# Patient Record
Sex: MALE | Race: WHITE | Employment: FULL TIME | URBAN - METROPOLITAN AREA
[De-identification: names, ages, dates, MRNs, and addresses within clinical notes are randomized per-mention and may not be internally consistent; named-entity substitution may affect disease eponyms.]

---

## 2021-01-29 ENCOUNTER — APPOINTMENT (OUTPATIENT)
Dept: CT IMAGING | Age: 56
DRG: 920 | End: 2021-01-29
Payer: COMMERCIAL

## 2021-01-29 ENCOUNTER — HOSPITAL ENCOUNTER (INPATIENT)
Age: 56
LOS: 4 days | Discharge: HOME | DRG: 920 | End: 2021-02-02
Attending: EMERGENCY MEDICINE | Admitting: INTERNAL MEDICINE
Payer: COMMERCIAL

## 2021-01-29 ENCOUNTER — APPOINTMENT (OUTPATIENT)
Dept: NUCLEAR MEDICINE | Age: 56
DRG: 920 | End: 2021-01-29
Payer: COMMERCIAL

## 2021-01-29 DIAGNOSIS — K92.1 BLOOD IN STOOL: Primary | ICD-10-CM

## 2021-01-29 DIAGNOSIS — Z98.890 STATUS POST COLONOSCOPY WITH POLYPECTOMY: ICD-10-CM

## 2021-01-29 PROBLEM — K92.2 LOWER GI BLEED: Status: ACTIVE | Noted: 2021-01-29

## 2021-01-29 LAB
ALBUMIN SERPL-MCNC: 4.3 GM/DL (ref 3.4–5)
ALP BLD-CCNC: 70 IU/L (ref 40–129)
ALT SERPL-CCNC: 28 U/L (ref 10–40)
ANION GAP SERPL CALCULATED.3IONS-SCNC: 12 MMOL/L (ref 4–16)
APTT: 36.8 SECONDS (ref 25.1–37.1)
AST SERPL-CCNC: 20 IU/L (ref 15–37)
BASOPHILS ABSOLUTE: 0.1 K/CU MM
BASOPHILS RELATIVE PERCENT: 0.6 % (ref 0–1)
BILIRUB SERPL-MCNC: 0.2 MG/DL (ref 0–1)
BUN BLDV-MCNC: 14 MG/DL (ref 6–23)
CALCIUM SERPL-MCNC: 9 MG/DL (ref 8.3–10.6)
CHLORIDE BLD-SCNC: 100 MMOL/L (ref 99–110)
CO2: 22 MMOL/L (ref 21–32)
CREAT SERPL-MCNC: 0.6 MG/DL (ref 0.9–1.3)
DIFFERENTIAL TYPE: ABNORMAL
EOSINOPHILS ABSOLUTE: 0.3 K/CU MM
EOSINOPHILS RELATIVE PERCENT: 3.4 % (ref 0–3)
GFR AFRICAN AMERICAN: >60 ML/MIN/1.73M2
GFR NON-AFRICAN AMERICAN: >60 ML/MIN/1.73M2
GLUCOSE BLD-MCNC: 139 MG/DL (ref 70–99)
HCT VFR BLD CALC: 25.8 % (ref 42–52)
HCT VFR BLD CALC: 27.9 % (ref 42–52)
HCT VFR BLD CALC: 36.4 % (ref 42–52)
HEMOGLOBIN: 12.4 GM/DL (ref 13.5–18)
HEMOGLOBIN: 8.5 GM/DL (ref 13.5–18)
HEMOGLOBIN: 9.1 GM/DL (ref 13.5–18)
IMMATURE NEUTROPHIL %: 0.8 % (ref 0–0.43)
INR BLD: 0.92 INDEX
LACTATE: 2.1 MMOL/L (ref 0.4–2)
LACTATE: 2.4 MMOL/L (ref 0.4–2)
LIPASE: 19 IU/L (ref 13–60)
LYMPHOCYTES ABSOLUTE: 2.1 K/CU MM
LYMPHOCYTES RELATIVE PERCENT: 26.9 % (ref 24–44)
MCH RBC QN AUTO: 31.8 PG (ref 27–31)
MCHC RBC AUTO-ENTMCNC: 34.1 % (ref 32–36)
MCV RBC AUTO: 93.3 FL (ref 78–100)
MONOCYTES ABSOLUTE: 0.7 K/CU MM
MONOCYTES RELATIVE PERCENT: 9.2 % (ref 0–4)
NUCLEATED RBC %: 0 %
PDW BLD-RTO: 12.8 % (ref 11.7–14.9)
PLATELET # BLD: 266 K/CU MM (ref 140–440)
PMV BLD AUTO: 10.7 FL (ref 7.5–11.1)
POTASSIUM SERPL-SCNC: 4.4 MMOL/L (ref 3.5–5.1)
PROTHROMBIN TIME: 11.1 SECONDS (ref 11.7–14.5)
RBC # BLD: 3.9 M/CU MM (ref 4.6–6.2)
SEGMENTED NEUTROPHILS ABSOLUTE COUNT: 4.6 K/CU MM
SEGMENTED NEUTROPHILS RELATIVE PERCENT: 59.1 % (ref 36–66)
SODIUM BLD-SCNC: 134 MMOL/L (ref 135–145)
TOTAL IMMATURE NEUTOROPHIL: 0.06 K/CU MM
TOTAL NUCLEATED RBC: 0 K/CU MM
TOTAL PROTEIN: 6.8 GM/DL (ref 6.4–8.2)
WBC # BLD: 7.7 K/CU MM (ref 4–10.5)

## 2021-01-29 PROCEDURE — 2580000003 HC RX 258: Performed by: SPECIALIST

## 2021-01-29 PROCEDURE — 2580000003 HC RX 258: Performed by: INTERNAL MEDICINE

## 2021-01-29 PROCEDURE — 86850 RBC ANTIBODY SCREEN: CPT

## 2021-01-29 PROCEDURE — P9016 RBC LEUKOCYTES REDUCED: HCPCS

## 2021-01-29 PROCEDURE — 2580000003 HC RX 258: Performed by: EMERGENCY MEDICINE

## 2021-01-29 PROCEDURE — 85018 HEMOGLOBIN: CPT

## 2021-01-29 PROCEDURE — 99285 EMERGENCY DEPT VISIT HI MDM: CPT

## 2021-01-29 PROCEDURE — 6370000000 HC RX 637 (ALT 250 FOR IP): Performed by: INTERNAL MEDICINE

## 2021-01-29 PROCEDURE — 83605 ASSAY OF LACTIC ACID: CPT

## 2021-01-29 PROCEDURE — 86900 BLOOD TYPING SEROLOGIC ABO: CPT

## 2021-01-29 PROCEDURE — 6360000004 HC RX CONTRAST MEDICATION: Performed by: EMERGENCY MEDICINE

## 2021-01-29 PROCEDURE — 85730 THROMBOPLASTIN TIME PARTIAL: CPT

## 2021-01-29 PROCEDURE — A9560 TC99M LABELED RBC: HCPCS | Performed by: SPECIALIST

## 2021-01-29 PROCEDURE — 74177 CT ABD & PELVIS W/CONTRAST: CPT

## 2021-01-29 PROCEDURE — 78278 ACUTE GI BLOOD LOSS IMAGING: CPT

## 2021-01-29 PROCEDURE — 1200000000 HC SEMI PRIVATE

## 2021-01-29 PROCEDURE — 85025 COMPLETE CBC W/AUTO DIFF WBC: CPT

## 2021-01-29 PROCEDURE — C9113 INJ PANTOPRAZOLE SODIUM, VIA: HCPCS | Performed by: EMERGENCY MEDICINE

## 2021-01-29 PROCEDURE — 85610 PROTHROMBIN TIME: CPT

## 2021-01-29 PROCEDURE — 36415 COLL VENOUS BLD VENIPUNCTURE: CPT

## 2021-01-29 PROCEDURE — 85014 HEMATOCRIT: CPT

## 2021-01-29 PROCEDURE — 96374 THER/PROPH/DIAG INJ IV PUSH: CPT

## 2021-01-29 PROCEDURE — 80053 COMPREHEN METABOLIC PANEL: CPT

## 2021-01-29 PROCEDURE — 6360000002 HC RX W HCPCS: Performed by: EMERGENCY MEDICINE

## 2021-01-29 PROCEDURE — 3430000000 HC RX DIAGNOSTIC RADIOPHARMACEUTICAL: Performed by: SPECIALIST

## 2021-01-29 PROCEDURE — 94761 N-INVAS EAR/PLS OXIMETRY MLT: CPT

## 2021-01-29 PROCEDURE — 83690 ASSAY OF LIPASE: CPT

## 2021-01-29 PROCEDURE — 86922 COMPATIBILITY TEST ANTIGLOB: CPT

## 2021-01-29 PROCEDURE — 86901 BLOOD TYPING SEROLOGIC RH(D): CPT

## 2021-01-29 RX ORDER — SODIUM CHLORIDE 9 MG/ML
INJECTION, SOLUTION INTRAVENOUS PRN
Status: DISCONTINUED | OUTPATIENT
Start: 2021-01-29 | End: 2021-02-01 | Stop reason: SDUPTHER

## 2021-01-29 RX ORDER — ALPRAZOLAM 0.5 MG/1
0.5 TABLET ORAL 3 TIMES DAILY PRN
COMMUNITY

## 2021-01-29 RX ORDER — ESCITALOPRAM OXALATE 10 MG/1
20 TABLET ORAL DAILY
Status: DISCONTINUED | OUTPATIENT
Start: 2021-01-29 | End: 2021-02-02 | Stop reason: HOSPADM

## 2021-01-29 RX ORDER — ACETAMINOPHEN 650 MG/1
650 SUPPOSITORY RECTAL EVERY 6 HOURS PRN
Status: DISCONTINUED | OUTPATIENT
Start: 2021-01-29 | End: 2021-02-02 | Stop reason: HOSPADM

## 2021-01-29 RX ORDER — SODIUM CHLORIDE 0.9 % (FLUSH) 0.9 %
10 SYRINGE (ML) INJECTION PRN
Status: DISCONTINUED | OUTPATIENT
Start: 2021-01-29 | End: 2021-02-02 | Stop reason: HOSPADM

## 2021-01-29 RX ORDER — SODIUM CHLORIDE 9 MG/ML
INJECTION, SOLUTION INTRAVENOUS CONTINUOUS
Status: DISCONTINUED | OUTPATIENT
Start: 2021-01-29 | End: 2021-02-01

## 2021-01-29 RX ORDER — GABAPENTIN 300 MG/1
300 CAPSULE ORAL 2 TIMES DAILY
COMMUNITY
Start: 2021-01-18

## 2021-01-29 RX ORDER — 0.9 % SODIUM CHLORIDE 0.9 %
1000 INTRAVENOUS SOLUTION INTRAVENOUS ONCE
Status: COMPLETED | OUTPATIENT
Start: 2021-01-29 | End: 2021-01-29

## 2021-01-29 RX ORDER — ESCITALOPRAM OXALATE 20 MG/1
20 TABLET ORAL DAILY
COMMUNITY

## 2021-01-29 RX ORDER — M-VIT,TX,IRON,MINS/CALC/FOLIC 27MG-0.4MG
TABLET ORAL DAILY
Status: DISCONTINUED | OUTPATIENT
Start: 2021-01-29 | End: 2021-02-02 | Stop reason: HOSPADM

## 2021-01-29 RX ORDER — GABAPENTIN 300 MG/1
300 CAPSULE ORAL 2 TIMES DAILY
Status: DISCONTINUED | OUTPATIENT
Start: 2021-01-29 | End: 2021-02-02 | Stop reason: HOSPADM

## 2021-01-29 RX ORDER — ONDANSETRON 2 MG/ML
4 INJECTION INTRAMUSCULAR; INTRAVENOUS EVERY 6 HOURS PRN
Status: DISCONTINUED | OUTPATIENT
Start: 2021-01-29 | End: 2021-02-02 | Stop reason: HOSPADM

## 2021-01-29 RX ORDER — SODIUM CHLORIDE 0.9 % (FLUSH) 0.9 %
10 SYRINGE (ML) INJECTION EVERY 12 HOURS SCHEDULED
Status: DISCONTINUED | OUTPATIENT
Start: 2021-01-29 | End: 2021-02-02 | Stop reason: HOSPADM

## 2021-01-29 RX ORDER — ALBUTEROL SULFATE 90 UG/1
2 AEROSOL, METERED RESPIRATORY (INHALATION) EVERY 6 HOURS PRN
Status: DISCONTINUED | OUTPATIENT
Start: 2021-01-29 | End: 2021-02-02 | Stop reason: HOSPADM

## 2021-01-29 RX ORDER — ACETAMINOPHEN 325 MG/1
650 TABLET ORAL EVERY 6 HOURS PRN
Status: DISCONTINUED | OUTPATIENT
Start: 2021-01-29 | End: 2021-02-02 | Stop reason: HOSPADM

## 2021-01-29 RX ORDER — PROMETHAZINE HYDROCHLORIDE 12.5 MG/1
12.5 TABLET ORAL EVERY 6 HOURS PRN
Status: DISCONTINUED | OUTPATIENT
Start: 2021-01-29 | End: 2021-02-02 | Stop reason: HOSPADM

## 2021-01-29 RX ORDER — ALPRAZOLAM 0.5 MG/1
0.5 TABLET ORAL 3 TIMES DAILY PRN
Status: DISCONTINUED | OUTPATIENT
Start: 2021-01-29 | End: 2021-01-30

## 2021-01-29 RX ORDER — PANTOPRAZOLE SODIUM 40 MG/10ML
40 INJECTION, POWDER, LYOPHILIZED, FOR SOLUTION INTRAVENOUS ONCE
Status: COMPLETED | OUTPATIENT
Start: 2021-01-29 | End: 2021-01-29

## 2021-01-29 RX ORDER — FLUTICASONE FUROATE AND VILANTEROL TRIFENATATE 100; 25 UG/1; UG/1
POWDER RESPIRATORY (INHALATION) DAILY
COMMUNITY
Start: 2021-01-25

## 2021-01-29 RX ORDER — BUDESONIDE AND FORMOTEROL FUMARATE DIHYDRATE 80; 4.5 UG/1; UG/1
2 AEROSOL RESPIRATORY (INHALATION) 2 TIMES DAILY
Status: DISCONTINUED | OUTPATIENT
Start: 2021-01-29 | End: 2021-02-02 | Stop reason: HOSPADM

## 2021-01-29 RX ADMIN — SODIUM CHLORIDE 1000 ML: 9 INJECTION, SOLUTION INTRAVENOUS at 08:18

## 2021-01-29 RX ADMIN — GABAPENTIN 300 MG: 300 CAPSULE ORAL at 11:36

## 2021-01-29 RX ADMIN — ESCITALOPRAM OXALATE 20 MG: 10 TABLET ORAL at 11:36

## 2021-01-29 RX ADMIN — MULTIPLE VITAMINS W/ MINERALS TAB 1 TABLET: TAB at 11:36

## 2021-01-29 RX ADMIN — SODIUM CHLORIDE, PRESERVATIVE FREE 10 ML: 5 INJECTION INTRAVENOUS at 11:39

## 2021-01-29 RX ADMIN — IOPAMIDOL 80 ML: 755 INJECTION, SOLUTION INTRAVENOUS at 08:03

## 2021-01-29 RX ADMIN — ALPRAZOLAM 0.5 MG: 0.5 TABLET ORAL at 22:04

## 2021-01-29 RX ADMIN — PANTOPRAZOLE SODIUM 40 MG: 40 INJECTION, POWDER, FOR SOLUTION INTRAVENOUS at 08:19

## 2021-01-29 RX ADMIN — GABAPENTIN 300 MG: 300 CAPSULE ORAL at 17:43

## 2021-01-29 RX ADMIN — Medication 28.6 MILLICURIE: at 20:23

## 2021-01-29 RX ADMIN — ALPRAZOLAM 0.5 MG: 0.5 TABLET ORAL at 11:36

## 2021-01-29 RX ADMIN — SODIUM CHLORIDE: 9 INJECTION, SOLUTION INTRAVENOUS at 19:52

## 2021-01-29 RX ADMIN — ALPRAZOLAM 0.5 MG: 0.5 TABLET ORAL at 17:43

## 2021-01-29 RX ADMIN — SODIUM CHLORIDE: 9 INJECTION, SOLUTION INTRAVENOUS at 13:25

## 2021-01-29 ASSESSMENT — ENCOUNTER SYMPTOMS
EYES NEGATIVE: 1
BLOOD IN STOOL: 1
DIARRHEA: 1
RESPIRATORY NEGATIVE: 1
ABDOMINAL PAIN: 1
ALLERGIC/IMMUNOLOGIC NEGATIVE: 1

## 2021-01-29 NOTE — ED NOTES
[id number] paged Dr Patricia Murray  01/29/21 [phone] Dr Stef Ahmadi returned call     Thelbert Malia  01/29/21 [phone]

## 2021-01-29 NOTE — ED NOTES
[id number] paged hospitalist     Cally Cain  01/29/21 [phone] hospitalist returned call      Cally Cain  01/29/21 [phone]

## 2021-01-29 NOTE — ED NOTES
Report given to [facility] and care transferred at this time     Jeffrey Espinoza RN  01/29/21 [phone]

## 2021-01-29 NOTE — PROGRESS NOTES
Medication History  [facility]    Patient Name: Vito Adhikari 1965     Medication history has been completed by: Aidee Alexander CPhT    Source(s) of information: patient and insurance claims     Primary Care Physician: No primary care provider on file. Pharmacy: [place], [place]    Allergies as of 01/29/2021    (No Known Allergies)        Prior to Admission medications    Medication Sig Start Date End Date Taking? Authorizing Provider   ALPRAZolam Annalise Bliss) 0.5 MG tablet Take 0.5 mg by mouth 3 times daily as needed for Sleep. Yes Historical Provider, MD   escitalopram (LEXAPRO) 20 MG tablet Take 20 mg by mouth daily   Yes Historical Provider, MD   Cyanocobalamin (VITAMIN B 12 PO) Take 1 tablet by mouth daily   Yes Historical Provider, MD   VITAMIN D PO Take 1 capsule by mouth daily   Yes Historical Provider, MD   Multiple Vitamins-Minerals (MULTIVITAMIN ADULT, MINERALS,) TABS Take 1 tablet by mouth daily   Yes Historical Provider, MD   Omega-3 Fatty Acids (FISH OIL PO) Take 2 capsules by mouth daily   Yes Historical Provider, MD   MAGNESIUM OXIDE PO Take 1 tablet by mouth daily   Yes Historical Provider, MD ROQUE ELLIPTA 100-25 MCG/INH AEPB inhaler Inhale into the lungs daily 1/25/21  Yes Historical Provider, MD   gabapentin (NEURONTIN) 300 MG capsule Take 300 mg by mouth 2 times daily. 1/18/21  Yes Historical Provider, MD   VENTOLIN  (90 Base) MCG/ACT inhaler Inhale 2 puffs into the lungs every 6 hours as needed 1/18/21  Yes Historical Provider, MD     Medications added or changed (ex. new medication, dosage change, interval change, formulation change):  See medication list as stated above    Comments:  Medication list reviewed with patient and insurance claims verified.   Patient reports he has taken no meds piror to ER visit    To my knowledge the above medication history is accurate as of 1/29/2021 9:04 AM.   Aidee Alexander CPhT   1/29/2021 9:04 AM

## 2021-01-29 NOTE — ED PROVIDER NOTES
[facility]      TRIAGE CHIEF COMPLAINT:   Rectal Bleeding and Dizziness      Ewiiaapaayp:  Oscar Dougherty is a 54 y.o. male that presents complaint of rectal bleeding. Patient streptococcal he is here for work, states he had a colonoscopy and upper endoscopy about a week ago had some polyps removed and and last night and today he has been having it. Rectal bleeding several times. Feels lightheaded and dizzy. Has some mild generalized abdominal pain. Denies any blood thinners loss of consciousness no fevers no nausea vomiting no chest pain shortness of breath denies vomiting blood denies any other trauma. No other questions or concerns. No abdominal surgeries before    REVIEW OF SYSTEMS:  At least 10 systems reviewed and otherwise acutely negative except as in the [address] Review of Systems   Constitutional: Negative. HENT: Negative. Eyes: Negative. Respiratory: Negative. Cardiovascular: Negative. Gastrointestinal: Positive for abdominal pain, blood in stool and diarrhea. Endocrine: Negative. Genitourinary: Negative. Musculoskeletal: Negative. Skin: Negative. Allergic/Immunologic: Negative. Neurological: Positive for dizziness and light-headedness. Hematological: Negative. Psychiatric/Behavioral: Negative. All other systems reviewed and are negative. Past Medical History:   Diagnosis Date    Neuropathy      History reviewed. No pertinent surgical history. History reviewed. No pertinent family history.   Social History     Socioeconomic History    Marital status:      Spouse name: Not on file    Number of children: Not on file    Years of education: Not on file    Highest education level: Not on file   Occupational History    Not on file   Social Needs    Financial resource strain: Not on file    Food insecurity     Worry: Not on file     Inability: Not on file    Transportation needs     Medical: Not on file     Non-medical: Not on file Medications   Medication Sig Dispense Refill    ALPRAZolam (XANAX) 0.5 MG tablet Take 0.5 mg by mouth 3 times daily as needed for Sleep.  escitalopram (LEXAPRO) 20 MG tablet Take 20 mg by mouth daily      Cyanocobalamin (VITAMIN B 12 PO) Take 1 tablet by mouth daily      VITAMIN D PO Take 1 capsule by mouth daily      Multiple Vitamins-Minerals (MULTIVITAMIN ADULT, MINERALS,) TABS Take 1 tablet by mouth daily      Omega-3 Fatty Acids (FISH OIL PO) Take 2 capsules by mouth daily      MAGNESIUM OXIDE PO Take 1 tablet by mouth daily      BREO ELLIPTA 100-25 MCG/INH AEPB inhaler Inhale into the lungs daily      gabapentin (NEURONTIN) 300 MG capsule Take 300 mg by mouth 2 times daily.  VENTOLIN  (90 Base) MCG/ACT inhaler Inhale 2 puffs into the lungs every 6 hours as needed         Nursing Notes Reviewed    VITAL SIGNS:  ED Triage Vitals   Enc Vitals Group      BP       Pulse       Resp       Temp       Temp src       SpO2       Weight       Height       Head Circumference       Peak Flow       Pain Score       Pain Loc       Pain Edu? Excl. in [address]? PHYSICAL EXAM:  Physical Exam  Vitals signs and nursing note reviewed. Constitutional:       General: He is not in acute distress. Appearance: Normal appearance. He is well-developed and well-groomed. He is obese. He is not ill-appearing, toxic-appearing or diaphoretic. HENT:      Head: Normocephalic and atraumatic. Right Ear: External ear normal.      Left Ear: External ear normal.      Nose: No congestion or rhinorrhea. Eyes:      General: No scleral icterus. Right eye: No discharge. Left eye: No discharge. Extraocular Movements: Extraocular movements intact. Conjunctiva/sclera: Conjunctivae normal.   Neck:      Musculoskeletal: Full passive range of motion without pain and normal range of motion. Normal range of motion.  No edema, erythema, neck rigidity, crepitus, injury, pain with movement or torticollis. Vascular: No JVD. Trachea: Phonation normal.   Cardiovascular:      Rate and Rhythm: Normal rate and regular rhythm. Pulses: Normal pulses. Heart sounds: Normal heart sounds. No murmur. No friction rub. No gallop. Pulmonary:      Effort: Pulmonary effort is normal. No respiratory distress. Breath sounds: Normal breath sounds. No stridor. No wheezing, rhonchi or rales. Abdominal:      General: Bowel sounds are normal. There is no distension. There are no signs of injury. Palpations: Abdomen is soft. There is no mass or pulsatile mass. Tenderness: There is generalized abdominal tenderness. There is no guarding or rebound. Negative signs include Gonzales's sign, Rovsing's sign and McBurney's sign. Hernia: No hernia is present. Musculoskeletal: Normal range of motion. General: No swelling, tenderness, deformity or signs of injury. Right lower leg: No edema. Left lower leg: No edema. Skin:     General: Skin is warm. Coloration: Skin is not jaundiced or pale. Findings: No bruising, erythema, lesion or rash. Neurological:      General: No focal deficit present. Mental Status: He is alert and oriented to person, place, and time. GCS: GCS eye subscore is 4. GCS verbal subscore is 5. GCS motor subscore is 6. Cranial Nerves: Cranial nerves are intact. No cranial nerve deficit, dysarthria or facial asymmetry. Sensory: No sensory deficit. Motor: Motor function is intact. No weakness, tremor, atrophy, abnormal muscle tone or seizure activity. Coordination: Coordination is intact. Coordination normal.      Gait: Gait is intact. Gait normal.   Psychiatric:         Mood and Affect: Mood normal.         Behavior: Behavior normal. Behavior is cooperative. Thought Content:  Thought content normal.           I have reviewed andinterpreted all of the currently available lab results from this visit (if applicable):    Results for orders placed or performed during the hospital encounter of 01/29/21   CBC Auto Differential   Result Value Ref Range    WBC 7.7 4.0 - 10.5 K/CU MM    RBC 3.90 (L) 4.6 - 6.2 M/CU MM    Hemoglobin 12.4 (L) 13.5 - 18.0 GM/DL    Hematocrit 36.4 (L) 42 - 52 %    MCV 93.3 78 - 100 FL    MCH 31.8 (H) 27 - 31 PG    MCHC 34.1 32.0 - 36.0 %    RDW 12.8 11.7 - 14.9 %    Platelets 220 651 - 724 K/CU MM    MPV 10.7 7.5 - 11.1 FL    Differential Type AUTOMATED DIFFERENTIAL     Segs Relative 59.1 36 - 66 %    Lymphocytes % 26.9 24 - 44 %    Monocytes % 9.2 (H) 0 - 4 %    Eosinophils % 3.4 (H) 0 - 3 %    Basophils % 0.6 0 - 1 %    Segs Absolute 4.6 K/CU MM    Lymphocytes Absolute 2.1 K/CU MM    Monocytes Absolute 0.7 K/CU MM    Eosinophils Absolute 0.3 K/CU MM    Basophils Absolute 0.1 K/CU MM    Nucleated RBC % 0.0 %    Total Nucleated RBC 0.0 K/CU MM    Total Immature Neutrophil 0.06 K/CU MM    Immature Neutrophil % 0.8 (H) 0 - 0.43 %   CMP   Result Value Ref Range    Sodium 134 (L) 135 - 145 MMOL/L    Potassium 4.4 3.5 - 5.1 MMOL/L    Chloride 100 99 - 110 mMol/L    CO2 22 21 - 32 MMOL/L    BUN 14 6 - 23 MG/DL    CREATININE 0.6 (L) 0.9 - 1.3 MG/DL    Glucose 139 (H) 70 - 99 MG/DL    Calcium 9.0 8.3 - 10.6 MG/DL    Albumin 4.3 3.4 - 5.0 GM/DL    Total Protein 6.8 6.4 - 8.2 GM/DL    Total Bilirubin 0.2 0.0 - 1.0 MG/DL    ALT 28 10 - 40 U/L    AST 20 15 - 37 IU/L    Alkaline Phosphatase 70 40 - 129 IU/L    GFR Non-African American >60 >60 mL/min/1.73m2    GFR African American >60 >60 mL/min/1.73m2    Anion Gap 12 4 - 16   Lipase   Result Value Ref Range    Lipase 19 13 - 60 IU/L   Lactic Acid, Plasma   Result Value Ref Range    Lactate 2.1 (HH) 0.4 - 2.0 mMOL/L   Protime/INR & PTT   Result Value Ref Range    Protime 11.1 (L) 11.7 - 14.5 SECONDS    INR 0.92 INDEX    aPTT 36.8 25.1 - 37.1 SECONDS   Lactic Acid, Plasma   Result Value Ref Range    Lactate 2.4 (HH) 0.4 - 2.0 mMOL/L   TYPE AND SCREEN   Result Value Ref Range    ABO/Rh O POSITIVE     Antibody Screen NEGATIVE         Radiographs (if obtained):  [] The following radiograph was interpreted by myself in the absence of a radiologist:  [x] Radiologist's Report Reviewed:    CT Abd/Pelv    EKG (if obtained): (All EKG's are interpreted by myself in the absence of a cardiologist)      Total critical care time today provided was at least 20 minutes. This excludes seperately billable procedure. Critical care time provided for reviewing labs, images, giving fluids, Protonix consulting GI consulting medicine that required close evaluation and/or intervention with concern for patient decompensation. MDM:    Patient here with rectal bleeding. Again he is from [facility] he is visiting he states he had a colonoscopy and upper endoscopy about a week ago in [facility] he states last night today he has had had some rectal bleeding. Several episodes. Blood has felt lightheaded has some mild generalized abdominal pain. Denies any nausea vomiting fevers chest pain shortness of breath denies any vomiting blood. Patient otherwise well appearing. Patient be given IV fluids Protonix will check labs imaging. Patient rechecked doing well he has had some more bloody bowel movements here but hemoglobin stable vital signs are stable his lactic acid did mildly increased from 2.1-2.4. CT scan is negative I did talk to GI recommend giving him Protonix admitted him to the hospital getting records from outside hospital as he likely has post polypectomy bleed and needs to be watched overnight patient agreeable to be observed overnight with GI consultation serial hemoglobin hematocrits. I will consult hospital medicine admit patient otherwise stable.     CLINICAL IMPRESSION:  Final diagnoses:   Blood in stool   Status post colonoscopy with polypectomy       (Please note that portions of this note may have been completed with a voice recognition program. Efforts were made to edit the dictations but occasionally words aremis-transcribed.)    DISPOSITION REFERRAL (if applicable):  No follow-up provider specified.     DISPOSITION MEDICATIONS (if applicable):  New Prescriptions    No medications on file          Carlo Holstein, [address] [facility],   01/29/21 [id number]

## 2021-01-29 NOTE — CONSULTS
[address], [address] [facility]                                  CONSULTATION    PATIENT NAME: Jimmy Montelongo                          :        1965  MED REC NO:   [id number]                          ROOM:       [id number]  ACCOUNT NO:   [de-identified]                           ADMIT DATE: 2021  PROVIDER:     Zoila Alvarez MD    CONSULT DATE:  2021    CHIEF COMPLAINT:  History of painless hematochezia. The patient is  status post colonoscopy with polypectomy on the 2021, rule out  post polypectomy bleeding. 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 The patient  denies abdominal pain, nausea, vomiting, hematemesis, anorexia or weight  loss. There is no history of fever or chills as well. The patient has  had about 4 or 5 bloody BMs prior to coming to the emergency room. In  the ER, the patient was hemodynamically stable. CBC showed a WBC count  of 7.7, hemoglobin 12.4, platelet count of 234,594. INR is 0.92. CAT  scan of the abdomen and pelvis was done and there was no evidence of  active bleeding and hepatomegaly with steatosis however was noted along  with scattered diverticulosis without obvious inflammation. A small fat  containing ventral hernia was noted as well. The patient was admitted  for further workup and management. The patient had an EGD and  colonoscopy both on the 2021 by Dr. Herrera Turcios in [facility] for  gastroesophageal reflux disease and history of polyp. According to the  patient, he had a \"fairly large 2 cm\" colon polyp removed and was  requested to have a followup colonoscopy in 1-year time. The patient  did well post procedure until early this morning when he woke up with  profuse hematochezia. The patient has had prior EGD done approximately 10 years ago also and a  colonoscopy at age 39 and had a polyp removed. The patient was told to  have a followup colonoscopy in 3 years' time, but the patient did not  have the colonoscopy until 01/25/2021. The patient is hemodynamically  stable at present. REVIEW OF SYSTEMS:  CENTRAL NERVOUS SYSTEM:  The patient denies headache or focal  sensorimotor symptoms. CARDIOVASCULAR SYSTEM:  No history of chest pain, shortness of breath or  leg swelling. GENITOURINARY SYSTEM:  No history of dysuria, pyuria, or hematuria. MUSCULOSKELETAL SYSTEM:  No history of aches and pains in muscles or  joints. RESPIRATORY SYSTEM:  No history of cough, hemoptysis, fever, or chills. PAST MEDICAL HISTORY:  Significant for history of neuropathy and  gastroesophageal reflux disease. FAMILY HISTORY:  The patient's maternal grandmother and grandfather both  were diagnosed with carcinoma of the lung and paternal grandfather also  with carcinoma of the lung. MEDICATIONS:  The patient denies taking NSAIDs or anticoagulants. SOCIOECONOMIC HISTORY:  No history of EtOH abuse, but the patient is a  current every day smoker. PAST SURGICAL HISTORY:  None. ALLERGIES:  No known drug allergies. PHYSICAL EXAMINATION:  GENERAL:  Shows a 42-year-old white gentleman of average build and  nutritional status, who is lying comfortably flat in bed, in no acute  distress. He is awake, alert, and oriented and pleasant to talk with. VITAL SIGNS:  Stable. HEENT:  Shows skull to be atraumatic. NECK:  Supple. CHEST:  Clear. HEART:  S1 and S2 are normal.  ABDOMEN:  Soft, nontender and nondistended. Liver and spleen are not  palpable. Bowel sounds are present. RECTAL:  Deferred. CNS:  Shows the patient to be awake, alert, and oriented. There are no  focal sensorimotor signs. MUSCULOSKELETAL:  Unremarkable.     LABORATORY DATA:  The labs drawn during the present hospitalization comprised of chem profile today, which is unremarkable. LFTs are within  normal limits. CBC shows a WBC count of 7.7, hemoglobin 12.4, platelet  count of 297,771 and INR is 0.92. CAT scan of the abdomen and pelvis is  negative for acute finding. IMPRESSION:  A 79-year-old white gentleman in apparently good health,  presents with acute onset of painless hematochezia, status post  colonoscopy with polypectomy on the 01/25/2021, rule out post  polypectomy bleeding. RECOMMENDATIONS:  1. Agree with present management with IV fluids. 2.  Parenteral analgesics and antiemetics as needed. 3.  Monitor the patient's serial H and H and transfuse on a p.r.n. basis  to keep hemoglobin above 7 gm percent. 4.  We will check EGD, colonoscopy, and path reports from the patient's  primary gastroenterologist in [facility] and to find out the site of  polypectomy. 5.  We will observe the patient for any signs of recurrent bleeding. 6.  If the patient has recurrent bleeding or continues to drop his  hemoglobin, will need a repeat colonoscopy as well. 7.  Bleeding scan and surgical consult later if needed. 8.  The case and plan have been discussed in detail with the patient.         Erinn Henriquez MD    D: 01/29/2021 12:26:14       T: 01/29/2021 14:09:31     [email or web address]  Job#: [id number]     Doc#: [id number]    CC:

## 2021-01-29 NOTE — H&P
History and Physical      Name:  Chris Howard /Age/Sex: 1965  (54 y.o. male)   MRN & CSN:  [id number] & [id number] Admission Date/Time: 2021  6:10 AM   Location:  [place] [address] PCP: No primary care provider on file. Hospital Day: 1    Assessment and Plan:   Chris Howard is a 54 y.o.  male  who presents with GI bleed    1) Acute blood loss anemia due to LGIB  -Likely post polypectomy bleed  -Vitally stable  -CT abdomen pelvis: Nonacute, also showed scattered diverticulosis without obvious inflammation. Hepatomegaly with steatosis. -Monitor hemoglobin every 6 hours  -Transfuse with packed red blood cell if Hb is less than 7  -GI consult placed    2) Obesity  -Diet counseling    3) Anxiety disorder  -On Xanax as needed        Diet No diet orders on file   DVT Prophylaxis [] Lovenox, []  Heparin, [] SCDs, [] Ambulation   GI Prophylaxis [] PPI,  [] H2 Blocker,  [] Carafate,  [] Diet/Tube Feeds   Code Status No Order   Disposition Patient requires continued admission due to GI bleed   MDM [] Low, [x] Moderate,[]  High  Patient's risk as above due to GI bleed     History of Present Illness:     Chief Complaint: <principal problem not specified>  Chris Howard is a 54 y.o.  male  who presents with GI bleed. Patient reported his symptoms started yesterday night, with dark blood. Has had several episodes more than 5 times. Reported associated lightheadedness. He denied any abdominal pain, chest pain or shortness of breath. Patient is not on any blood thinners and denied any history of bleeding disorders. Of note, patient recently had EGD and colonoscopy on 2021 in [facility]. Per patient, he had a polypectomy done.   Ten point ROS reviewed negative, unless as noted above    Objective:   No intake or output data in the 24 hours ending 21 1006   Vitals:   Vitals:    21 0900   BP: 114/73   Pulse: 86   Resp:    Temp:    SpO2: 94%     Physical Exam:   GEN Awake male, sitting upright in bed in no Physical activity     Days per week: None     Minutes per session: None    Stress: None   Relationships    Social connections     Talks on phone: None     Gets together: None     Attends Faith service: None     Active member of club or organization: None     Attends meetings of clubs or organizations: None     Relationship status: None    Intimate partner violence     Fear of current or ex partner: None     Emotionally abused: None     Physically abused: None     Forced sexual activity: None   Other Topics Concern    None   Social History Narrative    None       Medications:   Medications:    Infusions:   PRN Meds:       Electronically signed by Mary Lou Donahue MD on 1/29/2021 at 10:06 AM

## 2021-01-29 NOTE — PROGRESS NOTES
PT HAD 2 BMS WITH DARK COLORED BLOOD  VITALS STABLE   LABS NOTED HB 9.1 AFTER HYDRATION  WILL GET STAT BLEEDING AND SURGICAL CONSULT ALSO  IF BLEEDING SCAN POSITIVE WILL DO COLONOSCOPY  COLONOSCOPY REPORT FROM 01/25/20 REVIEWED PT HAD 5 POLYPS REMOVED

## 2021-01-29 NOTE — ED NOTES
Hand off report received from RONALD BLUM [facility], [facility]  01/29/21 9418

## 2021-01-30 ENCOUNTER — ANESTHESIA EVENT (OUTPATIENT)
Dept: INTERVENTIONAL RADIOLOGY/VASCULAR | Age: 56
DRG: 920 | End: 2021-01-30
Payer: COMMERCIAL

## 2021-01-30 ENCOUNTER — APPOINTMENT (OUTPATIENT)
Dept: INTERVENTIONAL RADIOLOGY/VASCULAR | Age: 56
DRG: 920 | End: 2021-01-30
Payer: COMMERCIAL

## 2021-01-30 ENCOUNTER — ANESTHESIA (OUTPATIENT)
Dept: INTERVENTIONAL RADIOLOGY/VASCULAR | Age: 56
DRG: 920 | End: 2021-01-30
Payer: COMMERCIAL

## 2021-01-30 VITALS
DIASTOLIC BLOOD PRESSURE: 65 MMHG | RESPIRATION RATE: 1 BRPM | SYSTOLIC BLOOD PRESSURE: 92 MMHG | TEMPERATURE: 98.6 F | OXYGEN SATURATION: 100 %

## 2021-01-30 LAB
ALBUMIN SERPL-MCNC: 3.3 GM/DL (ref 3.4–5)
ALP BLD-CCNC: 51 IU/L (ref 40–128)
ALT SERPL-CCNC: 16 U/L (ref 10–40)
AMYLASE: 29 U/L (ref 25–115)
ANION GAP SERPL CALCULATED.3IONS-SCNC: 8 MMOL/L (ref 4–16)
AST SERPL-CCNC: 11 IU/L (ref 15–37)
BACTERIA: NEGATIVE /HPF
BASOPHILS ABSOLUTE: 0 K/CU MM
BASOPHILS RELATIVE PERCENT: 0.3 % (ref 0–1)
BILIRUB SERPL-MCNC: 0.2 MG/DL (ref 0–1)
BILIRUBIN URINE: NEGATIVE MG/DL
BLOOD, URINE: NEGATIVE
BUN BLDV-MCNC: 13 MG/DL (ref 6–23)
CALCIUM SERPL-MCNC: 7.4 MG/DL (ref 8.3–10.6)
CHLORIDE BLD-SCNC: 105 MMOL/L (ref 99–110)
CLARITY: CLEAR
CO2: 23 MMOL/L (ref 21–32)
COLOR: YELLOW
CREAT SERPL-MCNC: 0.7 MG/DL (ref 0.9–1.3)
DIFFERENTIAL TYPE: ABNORMAL
EOSINOPHILS ABSOLUTE: 0.1 K/CU MM
EOSINOPHILS RELATIVE PERCENT: 0.8 % (ref 0–3)
GFR AFRICAN AMERICAN: >60 ML/MIN/1.73M2
GFR NON-AFRICAN AMERICAN: >60 ML/MIN/1.73M2
GLUCOSE BLD-MCNC: 155 MG/DL (ref 70–99)
GLUCOSE, URINE: 50 MG/DL
HCT VFR BLD CALC: 22.6 % (ref 42–52)
HCT VFR BLD CALC: 24.3 % (ref 42–52)
HCT VFR BLD CALC: 25 % (ref 42–52)
HCT VFR BLD CALC: 25.1 % (ref 42–52)
HCT VFR BLD CALC: 25.2 % (ref 42–52)
HEMOGLOBIN: 7.1 GM/DL (ref 13.5–18)
HEMOGLOBIN: 7.9 GM/DL (ref 13.5–18)
HEMOGLOBIN: 8.1 GM/DL (ref 13.5–18)
IMMATURE NEUTROPHIL %: 0.8 % (ref 0–0.43)
IRON: 76 UG/DL (ref 59–158)
KETONES, URINE: NEGATIVE MG/DL
LEUKOCYTE ESTERASE, URINE: NEGATIVE
LIPASE: 13 IU/L (ref 13–60)
LYMPHOCYTES ABSOLUTE: 1 K/CU MM
LYMPHOCYTES RELATIVE PERCENT: 11.5 % (ref 24–44)
MCH RBC QN AUTO: 31.3 PG (ref 27–31)
MCHC RBC AUTO-ENTMCNC: 32.4 % (ref 32–36)
MCV RBC AUTO: 96.5 FL (ref 78–100)
MONOCYTES ABSOLUTE: 0.3 K/CU MM
MONOCYTES RELATIVE PERCENT: 3.7 % (ref 0–4)
MUCUS: ABNORMAL HPF
NITRITE URINE, QUANTITATIVE: NEGATIVE
NUCLEATED RBC %: 0 %
PCT TRANSFERRIN: 31 % (ref 10–44)
PDW BLD-RTO: 13.6 % (ref 11.7–14.9)
PH, URINE: 5 (ref 5–8)
PLATELET # BLD: 184 K/CU MM (ref 140–440)
PMV BLD AUTO: 11.2 FL (ref 7.5–11.1)
POTASSIUM SERPL-SCNC: 5.1 MMOL/L (ref 3.5–5.1)
PROTEIN UA: NEGATIVE MG/DL
RBC # BLD: 2.59 M/CU MM (ref 4.6–6.2)
RBC URINE: ABNORMAL /HPF (ref 0–3)
SARS-COV-2, NAAT: NOT DETECTED
SEGMENTED NEUTROPHILS ABSOLUTE COUNT: 7.4 K/CU MM
SEGMENTED NEUTROPHILS RELATIVE PERCENT: 82.9 % (ref 36–66)
SODIUM BLD-SCNC: 136 MMOL/L (ref 135–145)
SOURCE: NORMAL
SPECIFIC GRAVITY UA: 1.02 (ref 1–1.03)
SQUAMOUS EPITHELIAL: 1 /HPF
TOTAL IMMATURE NEUTOROPHIL: 0.07 K/CU MM
TOTAL IRON BINDING CAPACITY: 244 UG/DL (ref 250–450)
TOTAL NUCLEATED RBC: 0 K/CU MM
TOTAL PROTEIN: 5.1 GM/DL (ref 6.4–8.2)
TRANSITIONAL EPITHELIAL: <1 /HPF
TRICHOMONAS: ABNORMAL /HPF
UNSATURATED IRON BINDING CAPACITY: 168 UG/DL (ref 110–370)
UROBILINOGEN, URINE: NORMAL MG/DL (ref 0.2–1)
WBC # BLD: 8.9 K/CU MM (ref 4–10.5)
WBC UA: ABNORMAL /HPF (ref 0–2)

## 2021-01-30 PROCEDURE — 85018 HEMOGLOBIN: CPT

## 2021-01-30 PROCEDURE — 3700000000 HC ANESTHESIA ATTENDED CARE

## 2021-01-30 PROCEDURE — 36430 TRANSFUSION BLD/BLD COMPNT: CPT

## 2021-01-30 PROCEDURE — B41F1ZZ FLUOROSCOPY OF RIGHT LOWER EXTREMITY ARTERIES USING LOW OSMOLAR CONTRAST: ICD-10-PCS | Performed by: RADIOLOGY

## 2021-01-30 PROCEDURE — 76937 US GUIDE VASCULAR ACCESS: CPT

## 2021-01-30 PROCEDURE — 94640 AIRWAY INHALATION TREATMENT: CPT

## 2021-01-30 PROCEDURE — 77001 FLUOROGUIDE FOR VEIN DEVICE: CPT

## 2021-01-30 PROCEDURE — P9016 RBC LEUKOCYTES REDUCED: HCPCS

## 2021-01-30 PROCEDURE — C1887 CATHETER, GUIDING: HCPCS

## 2021-01-30 PROCEDURE — 2709999900 HC NON-CHARGEABLE SUPPLY

## 2021-01-30 PROCEDURE — 6360000004 HC RX CONTRAST MEDICATION: Performed by: INTERNAL MEDICINE

## 2021-01-30 PROCEDURE — C1769 GUIDE WIRE: HCPCS

## 2021-01-30 PROCEDURE — C1751 CATH, INF, PER/CENT/MIDLINE: HCPCS

## 2021-01-30 PROCEDURE — 6370000000 HC RX 637 (ALT 250 FOR IP): Performed by: INTERNAL MEDICINE

## 2021-01-30 PROCEDURE — 02HV33Z INSERTION OF INFUSION DEVICE INTO SUPERIOR VENA CAVA, PERCUTANEOUS APPROACH: ICD-10-PCS | Performed by: RADIOLOGY

## 2021-01-30 PROCEDURE — 2000000000 HC ICU R&B

## 2021-01-30 PROCEDURE — 6360000002 HC RX W HCPCS: Performed by: SPECIALIST

## 2021-01-30 PROCEDURE — 6360000002 HC RX W HCPCS: Performed by: NURSE ANESTHETIST, CERTIFIED REGISTERED

## 2021-01-30 PROCEDURE — 83690 ASSAY OF LIPASE: CPT

## 2021-01-30 PROCEDURE — 6360000002 HC RX W HCPCS: Performed by: INTERNAL MEDICINE

## 2021-01-30 PROCEDURE — 83540 ASSAY OF IRON: CPT

## 2021-01-30 PROCEDURE — 36246 INS CATH ABD/L-EXT ART 2ND: CPT

## 2021-01-30 PROCEDURE — 82150 ASSAY OF AMYLASE: CPT

## 2021-01-30 PROCEDURE — 6370000000 HC RX 637 (ALT 250 FOR IP): Performed by: SPECIALIST

## 2021-01-30 PROCEDURE — 36247 INS CATH ABD/L-EXT ART 3RD: CPT

## 2021-01-30 PROCEDURE — U0002 COVID-19 LAB TEST NON-CDC: HCPCS

## 2021-01-30 PROCEDURE — 80053 COMPREHEN METABOLIC PANEL: CPT

## 2021-01-30 PROCEDURE — 99252 IP/OBS CONSLTJ NEW/EST SF 35: CPT | Performed by: SURGERY

## 2021-01-30 PROCEDURE — 2580000003 HC RX 258: Performed by: INTERNAL MEDICINE

## 2021-01-30 PROCEDURE — 36556 INSERT NON-TUNNEL CV CATH: CPT

## 2021-01-30 PROCEDURE — B4141ZZ FLUOROSCOPY OF SUPERIOR MESENTERIC ARTERY USING LOW OSMOLAR CONTRAST: ICD-10-PCS | Performed by: RADIOLOGY

## 2021-01-30 PROCEDURE — C9113 INJ PANTOPRAZOLE SODIUM, VIA: HCPCS | Performed by: SPECIALIST

## 2021-01-30 PROCEDURE — 36248 INS CATH ABD/L-EXT ART ADDL: CPT

## 2021-01-30 PROCEDURE — C1894 INTRO/SHEATH, NON-LASER: HCPCS

## 2021-01-30 PROCEDURE — 83550 IRON BINDING TEST: CPT

## 2021-01-30 PROCEDURE — 75774 ARTERY X-RAY EACH VESSEL: CPT

## 2021-01-30 PROCEDURE — 3700000001 HC ADD 15 MINUTES (ANESTHESIA)

## 2021-01-30 PROCEDURE — 81001 URINALYSIS AUTO W/SCOPE: CPT

## 2021-01-30 PROCEDURE — 2580000003 HC RX 258: Performed by: SPECIALIST

## 2021-01-30 PROCEDURE — 85025 COMPLETE CBC W/AUTO DIFF WBC: CPT

## 2021-01-30 PROCEDURE — 2500000003 HC RX 250 WO HCPCS: Performed by: NURSE ANESTHETIST, CERTIFIED REGISTERED

## 2021-01-30 PROCEDURE — 85014 HEMATOCRIT: CPT

## 2021-01-30 PROCEDURE — 75726 ARTERY X-RAYS ABDOMEN: CPT

## 2021-01-30 PROCEDURE — 2780000010 HC IMPLANT OTHER

## 2021-01-30 RX ORDER — ONDANSETRON 2 MG/ML
INJECTION INTRAMUSCULAR; INTRAVENOUS PRN
Status: DISCONTINUED | OUTPATIENT
Start: 2021-01-30 | End: 2021-01-30 | Stop reason: SDUPTHER

## 2021-01-30 RX ORDER — PROPOFOL 10 MG/ML
INJECTION, EMULSION INTRAVENOUS PRN
Status: DISCONTINUED | OUTPATIENT
Start: 2021-01-30 | End: 2021-01-30 | Stop reason: SDUPTHER

## 2021-01-30 RX ORDER — DEXAMETHASONE SODIUM PHOSPHATE 4 MG/ML
INJECTION, SOLUTION INTRA-ARTICULAR; INTRALESIONAL; INTRAMUSCULAR; INTRAVENOUS; SOFT TISSUE PRN
Status: DISCONTINUED | OUTPATIENT
Start: 2021-01-30 | End: 2021-01-30 | Stop reason: SDUPTHER

## 2021-01-30 RX ORDER — ROCURONIUM BROMIDE 10 MG/ML
INJECTION, SOLUTION INTRAVENOUS PRN
Status: DISCONTINUED | OUTPATIENT
Start: 2021-01-30 | End: 2021-01-30 | Stop reason: SDUPTHER

## 2021-01-30 RX ORDER — ALPRAZOLAM 0.5 MG/1
0.5 TABLET ORAL 3 TIMES DAILY PRN
Status: DISCONTINUED | OUTPATIENT
Start: 2021-01-30 | End: 2021-02-02 | Stop reason: HOSPADM

## 2021-01-30 RX ORDER — LIDOCAINE HYDROCHLORIDE 20 MG/ML
INJECTION, SOLUTION INFILTRATION; PERINEURAL PRN
Status: DISCONTINUED | OUTPATIENT
Start: 2021-01-30 | End: 2021-01-30 | Stop reason: SDUPTHER

## 2021-01-30 RX ORDER — SODIUM CHLORIDE 9 MG/ML
INJECTION, SOLUTION INTRAVENOUS PRN
Status: DISCONTINUED | OUTPATIENT
Start: 2021-01-30 | End: 2021-02-01 | Stop reason: SDUPTHER

## 2021-01-30 RX ORDER — SODIUM CHLORIDE 9 MG/ML
INJECTION, SOLUTION INTRAVENOUS CONTINUOUS PRN
Status: DISCONTINUED | OUTPATIENT
Start: 2021-01-30 | End: 2021-01-30 | Stop reason: SDUPTHER

## 2021-01-30 RX ORDER — PANTOPRAZOLE SODIUM 40 MG/10ML
40 INJECTION, POWDER, LYOPHILIZED, FOR SOLUTION INTRAVENOUS DAILY
Status: DISCONTINUED | OUTPATIENT
Start: 2021-01-30 | End: 2021-02-02 | Stop reason: HOSPADM

## 2021-01-30 RX ORDER — FENTANYL CITRATE 50 UG/ML
INJECTION, SOLUTION INTRAMUSCULAR; INTRAVENOUS PRN
Status: DISCONTINUED | OUTPATIENT
Start: 2021-01-30 | End: 2021-01-30 | Stop reason: SDUPTHER

## 2021-01-30 RX ORDER — MIDAZOLAM HYDROCHLORIDE 1 MG/ML
INJECTION INTRAMUSCULAR; INTRAVENOUS PRN
Status: DISCONTINUED | OUTPATIENT
Start: 2021-01-30 | End: 2021-01-30

## 2021-01-30 RX ORDER — IODIXANOL 320 MG/ML
68 INJECTION, SOLUTION INTRAVASCULAR
Status: COMPLETED | OUTPATIENT
Start: 2021-01-30 | End: 2021-01-30

## 2021-01-30 RX ORDER — MIDAZOLAM HYDROCHLORIDE 1 MG/ML
INJECTION INTRAMUSCULAR; INTRAVENOUS PRN
Status: DISCONTINUED | OUTPATIENT
Start: 2021-01-30 | End: 2021-01-30 | Stop reason: SDUPTHER

## 2021-01-30 RX ADMIN — PHENYLEPHRINE HYDROCHLORIDE 100 MCG: 10 INJECTION INTRAVENOUS at 02:16

## 2021-01-30 RX ADMIN — PROPOFOL 200 MG: 10 INJECTION, EMULSION INTRAVENOUS at 01:25

## 2021-01-30 RX ADMIN — BUDESONIDE AND FORMOTEROL FUMARATE DIHYDRATE 2 PUFF: 80; 4.5 AEROSOL RESPIRATORY (INHALATION) at 20:39

## 2021-01-30 RX ADMIN — ALPRAZOLAM 0.5 MG: 0.5 TABLET ORAL at 15:50

## 2021-01-30 RX ADMIN — SODIUM CHLORIDE: 9 INJECTION, SOLUTION INTRAVENOUS at 01:38

## 2021-01-30 RX ADMIN — IODIXANOL 68 ML: 320 INJECTION, SOLUTION INTRAVASCULAR at 04:15

## 2021-01-30 RX ADMIN — PANTOPRAZOLE SODIUM 40 MG: 40 INJECTION, POWDER, FOR SOLUTION INTRAVENOUS at 08:57

## 2021-01-30 RX ADMIN — ONDANSETRON 4 MG: 2 INJECTION INTRAMUSCULAR; INTRAVENOUS at 00:40

## 2021-01-30 RX ADMIN — SODIUM CHLORIDE: 9 INJECTION, SOLUTION INTRAVENOUS at 04:31

## 2021-01-30 RX ADMIN — ESCITALOPRAM OXALATE 20 MG: 10 TABLET ORAL at 08:56

## 2021-01-30 RX ADMIN — ROCURONIUM BROMIDE 10 MG: 10 INJECTION INTRAVENOUS at 02:24

## 2021-01-30 RX ADMIN — SODIUM CHLORIDE: 9 INJECTION, SOLUTION INTRAVENOUS at 01:25

## 2021-01-30 RX ADMIN — PHENYLEPHRINE HYDROCHLORIDE 100 MCG: 10 INJECTION INTRAVENOUS at 01:54

## 2021-01-30 RX ADMIN — PHENYLEPHRINE HYDROCHLORIDE 100 MCG: 10 INJECTION INTRAVENOUS at 02:53

## 2021-01-30 RX ADMIN — POLYETHYLENE GLYCOL 3350, SODIUM SULFATE ANHYDROUS, SODIUM BICARBONATE, SODIUM CHLORIDE, POTASSIUM CHLORIDE 4000 ML: 236; 22.74; 6.74; 5.86; 2.97 POWDER, FOR SOLUTION ORAL at 18:23

## 2021-01-30 RX ADMIN — MULTIPLE VITAMINS W/ MINERALS TAB 1 TABLET: TAB at 08:56

## 2021-01-30 RX ADMIN — SODIUM CHLORIDE: 9 INJECTION, SOLUTION INTRAVENOUS at 17:16

## 2021-01-30 RX ADMIN — ALPRAZOLAM 0.5 MG: 0.5 TABLET ORAL at 21:51

## 2021-01-30 RX ADMIN — PHENYLEPHRINE HYDROCHLORIDE 100 MCG: 10 INJECTION INTRAVENOUS at 01:50

## 2021-01-30 RX ADMIN — LIDOCAINE HYDROCHLORIDE 100 MG: 20 INJECTION, SOLUTION INFILTRATION; PERINEURAL at 01:25

## 2021-01-30 RX ADMIN — PHENYLEPHRINE HYDROCHLORIDE 100 MCG: 10 INJECTION INTRAVENOUS at 02:24

## 2021-01-30 RX ADMIN — MIDAZOLAM 2 MG: 1 INJECTION INTRAMUSCULAR; INTRAVENOUS at 00:30

## 2021-01-30 RX ADMIN — ONDANSETRON 4 MG: 2 INJECTION INTRAMUSCULAR; INTRAVENOUS at 02:50

## 2021-01-30 RX ADMIN — FENTANYL CITRATE 100 MCG: 50 INJECTION INTRAMUSCULAR; INTRAVENOUS at 01:25

## 2021-01-30 RX ADMIN — ROCURONIUM BROMIDE 50 MG: 10 INJECTION INTRAVENOUS at 01:25

## 2021-01-30 RX ADMIN — ROCURONIUM BROMIDE 10 MG: 10 INJECTION INTRAVENOUS at 01:57

## 2021-01-30 RX ADMIN — ALPRAZOLAM 0.5 MG: 0.5 TABLET ORAL at 09:19

## 2021-01-30 RX ADMIN — ROCURONIUM BROMIDE 10 MG: 10 INJECTION INTRAVENOUS at 03:00

## 2021-01-30 RX ADMIN — GABAPENTIN 300 MG: 300 CAPSULE ORAL at 08:56

## 2021-01-30 RX ADMIN — PHENYLEPHRINE HYDROCHLORIDE 100 MCG: 10 INJECTION INTRAVENOUS at 02:27

## 2021-01-30 RX ADMIN — PHENYLEPHRINE HYDROCHLORIDE 100 MCG: 10 INJECTION INTRAVENOUS at 01:57

## 2021-01-30 RX ADMIN — SUGAMMADEX 200 MG: 100 INJECTION, SOLUTION INTRAVENOUS at 03:35

## 2021-01-30 RX ADMIN — DEXAMETHASONE SODIUM PHOSPHATE 8 MG: 4 INJECTION, SOLUTION INTRAMUSCULAR; INTRAVENOUS at 01:31

## 2021-01-30 RX ADMIN — SODIUM CHLORIDE, PRESERVATIVE FREE 10 ML: 5 INJECTION INTRAVENOUS at 08:57

## 2021-01-30 RX ADMIN — GABAPENTIN 300 MG: 300 CAPSULE ORAL at 15:49

## 2021-01-30 RX ADMIN — PHENYLEPHRINE HYDROCHLORIDE 100 MCG: 10 INJECTION INTRAVENOUS at 03:00

## 2021-01-30 ASSESSMENT — PULMONARY FUNCTION TESTS
PIF_VALUE: 29
PIF_VALUE: 28
PIF_VALUE: 23
PIF_VALUE: 28
PIF_VALUE: 27
PIF_VALUE: 24
PIF_VALUE: 1
PIF_VALUE: 25
PIF_VALUE: 1
PIF_VALUE: 16
PIF_VALUE: 24
PIF_VALUE: 27
PIF_VALUE: 29
PIF_VALUE: 23
PIF_VALUE: 28
PIF_VALUE: 24
PIF_VALUE: 0
PIF_VALUE: 0
PIF_VALUE: 24
PIF_VALUE: 24
PIF_VALUE: 26
PIF_VALUE: 24
PIF_VALUE: 0
PIF_VALUE: 27
PIF_VALUE: 27
PIF_VALUE: 1
PIF_VALUE: 26
PIF_VALUE: 0
PIF_VALUE: 24
PIF_VALUE: 29
PIF_VALUE: 28
PIF_VALUE: 24
PIF_VALUE: 0
PIF_VALUE: 28
PIF_VALUE: 31
PIF_VALUE: 28
PIF_VALUE: 27
PIF_VALUE: 26
PIF_VALUE: 0
PIF_VALUE: 26
PIF_VALUE: 25
PIF_VALUE: 25
PIF_VALUE: 0
PIF_VALUE: 1
PIF_VALUE: 16
PIF_VALUE: 24
PIF_VALUE: 23
PIF_VALUE: 27
PIF_VALUE: 26
PIF_VALUE: 0
PIF_VALUE: 0
PIF_VALUE: 27
PIF_VALUE: 26
PIF_VALUE: 1
PIF_VALUE: 27
PIF_VALUE: 25
PIF_VALUE: 5
PIF_VALUE: 27
PIF_VALUE: 27
PIF_VALUE: 1
PIF_VALUE: 1
PIF_VALUE: 0
PIF_VALUE: 32
PIF_VALUE: 0
PIF_VALUE: 23
PIF_VALUE: 0
PIF_VALUE: 1
PIF_VALUE: 29
PIF_VALUE: 0
PIF_VALUE: 24
PIF_VALUE: 0
PIF_VALUE: 28
PIF_VALUE: 26
PIF_VALUE: 24
PIF_VALUE: 27
PIF_VALUE: 24
PIF_VALUE: 6
PIF_VALUE: 2
PIF_VALUE: 26
PIF_VALUE: 28
PIF_VALUE: 29
PIF_VALUE: 2
PIF_VALUE: 29
PIF_VALUE: 24
PIF_VALUE: 1
PIF_VALUE: 28
PIF_VALUE: 31
PIF_VALUE: 0
PIF_VALUE: 30
PIF_VALUE: 1
PIF_VALUE: 25
PIF_VALUE: 2
PIF_VALUE: 27
PIF_VALUE: 11
PIF_VALUE: 28
PIF_VALUE: 0
PIF_VALUE: 26
PIF_VALUE: 25
PIF_VALUE: 24
PIF_VALUE: 24
PIF_VALUE: 0
PIF_VALUE: 1
PIF_VALUE: 0
PIF_VALUE: 27
PIF_VALUE: 24
PIF_VALUE: 23
PIF_VALUE: 23
PIF_VALUE: 28
PIF_VALUE: 27
PIF_VALUE: 24
PIF_VALUE: 0
PIF_VALUE: 1
PIF_VALUE: 0
PIF_VALUE: 1
PIF_VALUE: 30
PIF_VALUE: 0
PIF_VALUE: 0
PIF_VALUE: 27
PIF_VALUE: 24
PIF_VALUE: 24
PIF_VALUE: 25
PIF_VALUE: 25
PIF_VALUE: 1
PIF_VALUE: 2
PIF_VALUE: 0

## 2021-01-30 NOTE — CONSULTS
Department of [address] Bud[address] Husain MD   Consult Note      Reason for Consult:  Lower GI bleed     Referring Physician:  Dr. Kim Degree:    Chief Complaint   Patient presents with    Rectal Bleeding    Dizziness       History Obtained From:  patient    HISTORY OF PRESENTILLNESS:                The patient is a 54 y.o. male who presents with painless lower GI blleding 5 days after colonoscopy with multiple polyps removed. He came to the ER and was admitted. He continued to have bloody stools and a bleeding scan was done showing tracer in the ascending and transverse colon. An arteriogram did not find any active bleeding. He has had one more bloody stool since arteriogram.    Past Medical History:    Past Medical History:   Diagnosis Date    Neuropathy      Past Surgical History:    History reviewed. No pertinent surgical history.   Current Medications:   Current Facility-Administered Medications   Medication Dose Route Frequency Provider Last Rate Last Admin    pantoprazole (PROTONIX) injection 40 mg  40 mg Intravenous Daily Alka Fam MD   40 mg at 01/30/21 0857    ALPRAZolam (XANAX) tablet 0.5 mg  0.5 mg Oral TID PRN Antwan Wallis MD   0.5 mg at 01/30/21 0919    polyethylene glycol (GoLYTELY) solution 4,000 mL  4,000 mL Oral Once Alka Fam MD        budesonide-formoterol (SYMBICORT) 80-4.5 MCG/ACT inhaler 2 puff  2 puff Inhalation BID Nikunj Conrad MD        escitalopram (LEXAPRO) tablet 20 mg  20 mg Oral Daily Alec WILLIAMSON MD   20 mg at 01/30/21 0856    gabapentin (NEURONTIN) capsule 300 mg  300 mg Oral BID Nikunj Conrad MD   300 mg at 01/30/21 8910    therapeutic multivitamin-minerals   Oral Daily Nikunj Conrad MD   1 tablet at 01/30/21 0856    albuterol sulfate  (90 Base) MCG/ACT inhaler 2 puff  2 puff Inhalation Q6H PRN Nikunj Conrad MD        sodium chloride flush 0.9 % injection 10 mL  10 mL Intravenous 2 times per day Nikunj Conrad MD   10 mL at 01/30/21 0857    sodium chloride flush 0.9 % injection 10 mL  10 mL Intravenous PRN Marcial Lundy MD        promethazine (PHENERGAN) tablet 12.5 mg  12.5 mg Oral Q6H PRN Marcial Lundy MD        Or    ondansetron [facility]) injection 4 mg  4 mg Intravenous Q6H PRN Marcial Lundy MD   4 mg at 01/30/21 0040    acetaminophen (TYLENOL) tablet 650 mg  650 mg Oral Q6H PRN Marcial Lundy MD        Or    acetaminophen (TYLENOL) suppository 650 mg  650 mg Rectal Q6H PRN Marcial Lundy MD        0.9 % sodium chloride infusion   Intravenous Continuous Taylor Aguilera  mL/hr at 01/30/21 0431 New Bag at 01/30/21 0431    hydrALAZINE (APRESOLINE) 10 mg in sodium chloride 0.9 % 50 mL ivpb  10 mg Intravenous Q6H PRN Marcial Lundy MD        0.9 % sodium chloride infusion   Intravenous PRN Taylor Aguilera MD          Allergies:  Patient has no known allergies.     Social History:   Social History     Socioeconomic History    Marital status:      Spouse name: Not on file    Number of children: Not on file    Years of education: Not on file    Highest education level: Not on file   Occupational History    Not on file   Social Needs    Financial resource strain: Not on file    Food insecurity     Worry: Not on file     Inability: Not on file    Transportation needs     Medical: Not on file     Non-medical: Not on file   Tobacco Use    Smoking status: Current Every Day Smoker     Types: E-Cigarettes    Smokeless tobacco: Never Used   Substance and Sexual Activity    Alcohol use: Not Currently    Drug use: Never    Sexual activity: Not on file   Lifestyle    Physical activity     Days per week: Not on file     Minutes per session: Not on file    Stress: Not on file   Relationships    Social connections     Talks on phone: Not on file     Gets together: Not on file     Attends [employer] service: Not on file     Active member of club or organization: Not on file Attends meetings of clubs or organizations: Not on file     Relationship status: Not on file    Intimate partner violence     Fear of current or ex partner: Not on file     Emotionally abused: Not on file     Physically abused: Not on file     Forced sexual activity: Not on file   Other Topics Concern    Not on file   Social History Narrative    Not on file     Family History:   History reviewed. No pertinent family history.      REVIEW OF SYSTEMS:    CONSTITUTIONAL:  positive for  fatigue  HEENT:  negative  CARDIOVASCULAR:  negative  GASTROINTESTINAL:  negative  GENITOURINARY:  negative  HEMATOLOGIC/LYMPHATIC:  negative  ENDOCRINE:  negative    PHYSICAL EXAM:    VITALS:  BP (!) 142/67   Pulse 87   Temp 98.6 °F (37 °C) (Oral)   Resp 21   Ht 6' 0.01\" (1.829 m)   Wt 273 lb 9.6 oz (124.1 kg)   SpO2 93%   BMI 37.10 kg/m²   CONSTITUTIONAL:  awake, alert, no apparent distress and mildly obese  ENT:  normocepalic, without obvious abnormality  NECK:  supple, symmetrical, trachea midline   LUNGS:  clear to auscultation  CARDIOVASCULAR:  regular rate and rhythm   GASTROINTESTINAL;   normal bowel sounds, soft, non-distended, non-tender, voluntary guarding absent, no masses palpated   MUSCULOSKELETAL:  0+ pitting edema lower extremities  NEUROLOGIC:  Mental Status Exam:  Level of Alertness:   awake  Orientation:   person, place, time    DATA:    CBC:   Lab Results   Component Value Date    WBC 7.7 01/29/2021    RBC 3.90 01/29/2021    HGB 8.1 01/30/2021    HCT 25.2 01/30/2021    MCV 93.3 01/29/2021    MCH 31.8 01/29/2021    MCHC 34.1 01/29/2021    RDW 12.8 01/29/2021     01/29/2021    MPV 10.7 01/29/2021     CMP:    Lab Results   Component Value Date     01/30/2021    K 5.1 01/30/2021     01/30/2021    CO2 23 01/30/2021    BUN 13 01/30/2021    CREATININE 0.7 01/30/2021    GFRAA >60 01/30/2021    LABGLOM >60 01/30/2021    GLUCOSE 155 01/30/2021    PROT 5.1 01/30/2021    LABALBU 3.3 01/30/2021 CALCIUM 7.4 01/30/2021    BILITOT 0.2 01/30/2021    ALKPHOS 51 01/30/2021    AST 11 01/30/2021    ALT 16 01/30/2021       ASSESSMENT/RECOMMENDATIONS:  LGI bleed after polypectomy. If he continues to bleed he will need a colonoscopy and hopefully bleeding can be controlled that way. If not, will have Dr. Khris Hoff tattoo the area and he will need to have that area resected. Will follow with you. Yee Black.

## 2021-01-30 NOTE — PROGRESS NOTES
Hospitalist Progress Note      Name:  Joel Oliver /Age/Sex: 1965  (54 y.o. male)   MRN & CSN:  [id number] & [id number] Admission Date/Time: 2021  6:10 AM   Location:  -A PCP: No primary care provider on file. Hospital Day: 2    Assessment and Plan:   Joel Oliver is a 54 y.o.  male  who presents with GI bleed     #Acute blood loss anemia due to LGIB  -Patient had multiple polyps removed recently in [facility].  -Patient had bleeding overnight requiring emergent angiogram  -Status post angiography which was negative for an acute bleed however there was gross bleeding in the colon.  -Status post 1 unit PRBC H&H 8.1  -CT abdomen pelvis: Nonacute, also showed scattered diverticulosis without obvious inflammation. Hepatomegaly with steatosis. -Monitor hemoglobin every 6 hours  -PPI  -Transfuse with packed red blood cell if Hb is less than 7  -GI consulted: N.p.o. at midnight for possible colonoscopy EGD tomorrow. -IR on board  -General surgery on board       #Obesity  -Diet and exercise counseling     # Anxiety disorder  -Lexapro  -On Xanax as needed    Diet DIET CLEAR LIQUID;  Diet NPO Time Specified   DVT Prophylaxis [] Lovenox, []  Heparin, [] SCDs, [] Ambulation   GI Prophylaxis [] PPI,  [] H2 Blocker,  [] Carafate,  [] Diet/Tube Feeds   Code Status Full Code   Disposition Patient requires continued admission due to LGIB         History of Present Illness:     Chief Complaint: <principal problem not specified>  Joel Oliver is a 54 y.o.  male  who presents with lower GI bleed    Denies any bleeding since the procedure, has no abdominal pain, nausea or vomiting. Ten point ROS reviewed negative, unless as noted above    Objective:        Intake/Output Summary (Last 24 hours) at 2021 1004  Last data filed at 2021 0357  Gross per 24 hour   Intake 1500 ml   Output 800 ml   Net 700 ml      Vitals:   Vitals:    21 0600   BP: (!) 142/67   Pulse: 87   Resp: 21   Temp:    SpO2:

## 2021-01-30 NOTE — ANESTHESIA PRE PROCEDURE
Department of Anesthesiology  Preprocedure Note       Name:  Miriam Berry   Age:  54 y.o.  :  1965                                          MRN:  [id number]         Date:  2021      Surgeon: * No surgeons listed *    Procedure: * No procedures listed *    Medications prior to admission:   Prior to Admission medications    Medication Sig Start Date End Date Taking? Authorizing Provider   ALPRAZolam Clorinda Crazier) 0.5 MG tablet Take 0.5 mg by mouth 3 times daily as needed for Sleep. Yes Historical Provider, MD   escitalopram (LEXAPRO) 20 MG tablet Take 20 mg by mouth daily   Yes Historical Provider, MD   Cyanocobalamin (VITAMIN B 12 PO) Take 1 tablet by mouth daily   Yes Historical Provider, MD   VITAMIN D PO Take 1 capsule by mouth daily   Yes Historical Provider, MD   Multiple Vitamins-Minerals (MULTIVITAMIN ADULT, MINERALS,) TABS Take 1 tablet by mouth daily   Yes Historical Provider, MD   Omega-3 Fatty Acids (FISH OIL PO) Take 2 capsules by mouth daily   Yes Historical Provider, MD   MAGNESIUM OXIDE PO Take 1 tablet by mouth daily   Yes Historical Provider, MD ROQUE ELLIPTA 100-25 MCG/INH AEPB inhaler Inhale into the lungs daily 21  Yes Historical Provider, MD   gabapentin (NEURONTIN) 300 MG capsule Take 300 mg by mouth 2 times daily.  21  Yes Historical Provider, MD   VENTOLIN  (90 Base) MCG/ACT inhaler Inhale 2 puffs into the lungs every 6 hours as needed 21  Yes Historical Provider, MD       Current medications:    Current Facility-Administered Medications   Medication Dose Route Frequency Provider Last Rate Last Admin    ALPRAZolam Clorinda Crazier) tablet 0.5 mg  0.5 mg Oral TID PRN Abran Quintero MD   0.5 mg at 21 2204    budesonide-formoterol (SYMBICORT) 80-4.5 MCG/ACT inhaler 2 puff  2 puff Inhalation BID Abran Quintero MD        escitalopram (LEXAPRO) tablet 20 mg  20 mg Oral Daily Abran Quintero MD   20 mg at 21 8134  0.9 % sodium chloride infusion    Continuous PRN Filiberto Ivy, APRN - CRNA   New Bag at 01/30/21 0138    dexamethasone (DECADRON) injection    PRN [facility], APRN - CRNA   8 mg at 01/30/21 0131    ondansetron (ZOFRAN) injection    PRN [facility], APRN - CRNA   4 mg at 01/30/21 0250    phenylephrine (JEANINE-SYNEPHRINE) injection    PRN [facility], APRN - CRNA   100 mcg at 01/30/21 0300    midazolam (VERSED) injection    PRN [facility], APRN - CRNA   2 mg at 01/30/21 0030       Allergies:  No Known Allergies    Problem List:    Patient Active Problem List   Diagnosis Code    Lower GI bleed K92.2       Past Medical History:        Diagnosis Date    Neuropathy        Past Surgical History:  History reviewed. No pertinent surgical history. Social History:    Social History     Tobacco Use    Smoking status: Current Every Day Smoker     Types: E-Cigarettes    Smokeless tobacco: Never Used   Substance Use Topics    Alcohol use: Not Currently                                Ready to quit: Not Answered  Counseling given: Not Answered      Vital Signs (Current):   Vitals:    01/29/21 1947 01/29/21 2200 01/29/21 2315 01/29/21 2350   BP: (!) 111/57  (!) 126/58 134/66   Pulse: 96  102 102   Resp: 16  16 15   Temp: 36.3 °C (97.4 °F)  36.6 °C (97.8 °F) 36.7 °C (98.1 °F)   TempSrc: Oral  Oral Oral   SpO2: 97%  95% 96%   Weight:       Height:  6' 0.01\" (1.829 m)                                                BP Readings from Last 3 Encounters:   01/29/21 134/66   01/30/21 102/60       NPO Status: Time of last liquid consumption: 1800                                                 Date of last liquid consumption: 01/29/21                        Date of last solid food consumption: (clear liquid diet )    BMI:   Wt Readings from Last 3 Encounters:   01/29/21 273 lb 9.6 oz (124.1 kg)     Body mass index is 37.1 kg/m².     CBC:   Lab Results   Component Value Date    WBC 7.7 01/29/2021 RBC 3.90 01/29/2021    HGB 8.5 01/29/2021    HCT 25.8 01/29/2021    MCV 93.3 01/29/2021    RDW 12.8 01/29/2021     01/29/2021       CMP:   Lab Results   Component Value Date     01/29/2021    K 4.4 01/29/2021     01/29/2021    CO2 22 01/29/2021    BUN 14 01/29/2021    CREATININE 0.6 01/29/2021    GFRAA >60 01/29/2021    LABGLOM >60 01/29/2021    GLUCOSE 139 01/29/2021    PROT 6.8 01/29/2021    CALCIUM 9.0 01/29/2021    BILITOT 0.2 01/29/2021    ALKPHOS 70 01/29/2021    AST 20 01/29/2021    ALT 28 01/29/2021       POC Tests: No results for input(s): POCGLU, POCNA, POCK, POCCL, POCBUN, POCHEMO, POCHCT in the last 72 hours. Coags:   Lab Results   Component Value Date    PROTIME 11.1 01/29/2021    INR 0.92 01/29/2021    APTT 36.8 01/29/2021       HCG (If Applicable): No results found for: PREGTESTUR, PREGSERUM, HCG, HCGQUANT     ABGs: No results found for: PHART, PO2ART, RGZ0WGB, XRD4TPF, BEART, V9HZKEDJ     Type & Screen (If Applicable):  No results found for: LABABO, LABRH    Drug/Infectious Status (If Applicable):  No results found for: HIV, HEPCAB    COVID-19 Screening (If Applicable):   Lab Results   Component Value Date    COVID19 NOT DETECTED 01/30/2021         Anesthesia Evaluation  Patient summary reviewed  Airway: Mallampati: III        Dental:    (+) upper dentures and lower dentures      Pulmonary: breath sounds clear to auscultation  (+) sleep apnea:  asthma:                            Cardiovascular:            Rhythm: regular                      Neuro/Psych:               GI/Hepatic/Renal:   (+) morbid obesity         ROS comment: GIB. Endo/Other:    (+) blood dyscrasia: anemia:., .                 Abdominal:   (+) obese,         Vascular:                                        Anesthesia Plan      general     ASA 3 - emergent       Induction: intravenous. central line and arterial line  MIPS: Postoperative opioids intended and Postoperative ventilation. Anesthetic plan and risks discussed with patient. Use of blood products discussed with patient whom consented to blood products. Plan discussed with CRNA.     Attending anesthesiologist reviewed and agrees with Pre Eval content              SONIA Forte - CRNA   1/30/2021

## 2021-01-30 NOTE — PROGRESS NOTES
X-ray called and stated that the nuclear med tech would be here to do the scan around 2100 and transport will come and get patient. Patient ws updated along with charge nurse. .  Vitals are stable and patient is alert and oriented at this time. Will keep monitoring.

## 2021-01-30 NOTE — PROGRESS NOTES
Patient had bloody BM about 1 to 1.5  hours   prior to angiogram.  Selective Sup Mesenteric Angiogram with superselection of middle colic (transverse colon), right colic (ascending colon incl hepatic flexure) and ileocolic branches reveal no active extravasation. Also selective Inf Mesenteric Angiogram to visualize the sigmoid colon branches was negative also. Discussed findings with Dr. Robin Tan and Glenis Sierra.

## 2021-01-30 NOTE — PROGRESS NOTES
This nurse contacted lab. ETA on rapid covid is four minutes. Patient is prepped and ready to go to IR.

## 2021-01-30 NOTE — PROGRESS NOTES
PT DOING FAIR LAST BM 6 HRS AGO NO ABD PAIN  PT HAD BLEEDING SCAN LAST NIGHT WHICH WAS POSITIVE FOR RIGHT COLON BLEED EMERGENCY ANGIO DONE WITH NO ACTIVE BLEEDING D/W IR CONSULTANT DR LACEY  PT TRANSFUSED WITH 1 UNIT PRBC ALSO T TRANSFERRED TO ICU LAST NIGHT  SURGICAL CONSULT WITH DR Cleopatra Varner IN ORDER  VITALS STABLE   LABS NOTED HB 8.1 AFTER 1 UNIT  WILL CPM CLEAN OUT COLON TODAY AND DO COLONOSCOPY IN AM F/U LABS D/W PT IN DETAIL

## 2021-01-31 ENCOUNTER — ANESTHESIA (OUTPATIENT)
Dept: ENDOSCOPY | Age: 56
DRG: 920 | End: 2021-01-31
Payer: COMMERCIAL

## 2021-01-31 ENCOUNTER — ANESTHESIA EVENT (OUTPATIENT)
Dept: ENDOSCOPY | Age: 56
DRG: 920 | End: 2021-01-31
Payer: COMMERCIAL

## 2021-01-31 VITALS — SYSTOLIC BLOOD PRESSURE: 113 MMHG | OXYGEN SATURATION: 98 % | DIASTOLIC BLOOD PRESSURE: 67 MMHG

## 2021-01-31 LAB
ALBUMIN SERPL-MCNC: 3.1 GM/DL (ref 3.4–5)
ALP BLD-CCNC: 43 IU/L (ref 40–128)
ALT SERPL-CCNC: 12 U/L (ref 10–40)
ANION GAP SERPL CALCULATED.3IONS-SCNC: 8 MMOL/L (ref 4–16)
AST SERPL-CCNC: 9 IU/L (ref 15–37)
BASOPHILS ABSOLUTE: 0 K/CU MM
BASOPHILS RELATIVE PERCENT: 0.3 % (ref 0–1)
BILIRUB SERPL-MCNC: 0.2 MG/DL (ref 0–1)
BUN BLDV-MCNC: 7 MG/DL (ref 6–23)
CALCIUM SERPL-MCNC: 7.4 MG/DL (ref 8.3–10.6)
CHLORIDE BLD-SCNC: 106 MMOL/L (ref 99–110)
CO2: 26 MMOL/L (ref 21–32)
CREAT SERPL-MCNC: 0.6 MG/DL (ref 0.9–1.3)
DIFFERENTIAL TYPE: ABNORMAL
EOSINOPHILS ABSOLUTE: 0 K/CU MM
EOSINOPHILS RELATIVE PERCENT: 0.5 % (ref 0–3)
ESTIMATED AVERAGE GLUCOSE: 111 MG/DL
GFR AFRICAN AMERICAN: >60 ML/MIN/1.73M2
GFR NON-AFRICAN AMERICAN: >60 ML/MIN/1.73M2
GLUCOSE BLD-MCNC: 131 MG/DL (ref 70–99)
GLUCOSE BLD-MCNC: 145 MG/DL (ref 70–99)
HBA1C MFR BLD: 5.5 % (ref 4.2–6.3)
HCT VFR BLD CALC: 22.3 % (ref 42–52)
HCT VFR BLD CALC: 23.5 % (ref 42–52)
HCT VFR BLD CALC: 23.6 % (ref 42–52)
HEMOGLOBIN: 7.1 GM/DL (ref 13.5–18)
HEMOGLOBIN: 7.4 GM/DL (ref 13.5–18)
HEMOGLOBIN: 7.5 GM/DL (ref 13.5–18)
IMMATURE NEUTROPHIL %: 1.1 % (ref 0–0.43)
LYMPHOCYTES ABSOLUTE: 1.7 K/CU MM
LYMPHOCYTES RELATIVE PERCENT: 22.8 % (ref 24–44)
MCH RBC QN AUTO: 29 PG (ref 27–31)
MCHC RBC AUTO-ENTMCNC: 31.8 % (ref 32–36)
MCV RBC AUTO: 91 FL (ref 78–100)
MONOCYTES ABSOLUTE: 0.7 K/CU MM
MONOCYTES RELATIVE PERCENT: 9.7 % (ref 0–4)
NUCLEATED RBC %: 0 %
PDW BLD-RTO: 17.9 % (ref 11.7–14.9)
PLATELET # BLD: 150 K/CU MM (ref 140–440)
PMV BLD AUTO: 10.4 FL (ref 7.5–11.1)
POTASSIUM SERPL-SCNC: 3.5 MMOL/L (ref 3.5–5.1)
RBC # BLD: 2.45 M/CU MM (ref 4.6–6.2)
SEGMENTED NEUTROPHILS ABSOLUTE COUNT: 5 K/CU MM
SEGMENTED NEUTROPHILS RELATIVE PERCENT: 65.6 % (ref 36–66)
SODIUM BLD-SCNC: 140 MMOL/L (ref 135–145)
TOTAL IMMATURE NEUTOROPHIL: 0.08 K/CU MM
TOTAL NUCLEATED RBC: 0 K/CU MM
TOTAL PROTEIN: 4.7 GM/DL (ref 6.4–8.2)
WBC # BLD: 7.6 K/CU MM (ref 4–10.5)

## 2021-01-31 PROCEDURE — 2500000003 HC RX 250 WO HCPCS: Performed by: SPECIALIST

## 2021-01-31 PROCEDURE — 85018 HEMOGLOBIN: CPT

## 2021-01-31 PROCEDURE — 0W3P8ZZ CONTROL BLEEDING IN GASTROINTESTINAL TRACT, VIA NATURAL OR ARTIFICIAL OPENING ENDOSCOPIC: ICD-10-PCS | Performed by: SPECIALIST

## 2021-01-31 PROCEDURE — 94640 AIRWAY INHALATION TREATMENT: CPT

## 2021-01-31 PROCEDURE — 36592 COLLECT BLOOD FROM PICC: CPT

## 2021-01-31 PROCEDURE — 3700000000 HC ANESTHESIA ATTENDED CARE: Performed by: SPECIALIST

## 2021-01-31 PROCEDURE — 83036 HEMOGLOBIN GLYCOSYLATED A1C: CPT

## 2021-01-31 PROCEDURE — 3700000001 HC ADD 15 MINUTES (ANESTHESIA): Performed by: SPECIALIST

## 2021-01-31 PROCEDURE — 2580000003 HC RX 258: Performed by: INTERNAL MEDICINE

## 2021-01-31 PROCEDURE — 2709999900 HC NON-CHARGEABLE SUPPLY: Performed by: SPECIALIST

## 2021-01-31 PROCEDURE — P9016 RBC LEUKOCYTES REDUCED: HCPCS

## 2021-01-31 PROCEDURE — 6370000000 HC RX 637 (ALT 250 FOR IP): Performed by: INTERNAL MEDICINE

## 2021-01-31 PROCEDURE — 80053 COMPREHEN METABOLIC PANEL: CPT

## 2021-01-31 PROCEDURE — 2580000003 HC RX 258: Performed by: SPECIALIST

## 2021-01-31 PROCEDURE — 2000000000 HC ICU R&B

## 2021-01-31 PROCEDURE — 85025 COMPLETE CBC W/AUTO DIFF WBC: CPT

## 2021-01-31 PROCEDURE — 82962 GLUCOSE BLOOD TEST: CPT

## 2021-01-31 PROCEDURE — 3609009900 HC COLONOSCOPY W/CONTROL BLEEDING ANY METHOD: Performed by: SPECIALIST

## 2021-01-31 PROCEDURE — 2720000010 HC SURG SUPPLY STERILE: Performed by: SPECIALIST

## 2021-01-31 PROCEDURE — 2500000003 HC RX 250 WO HCPCS

## 2021-01-31 PROCEDURE — 6370000000 HC RX 637 (ALT 250 FOR IP): Performed by: SPECIALIST

## 2021-01-31 PROCEDURE — 6360000002 HC RX W HCPCS: Performed by: SPECIALIST

## 2021-01-31 PROCEDURE — 6360000002 HC RX W HCPCS

## 2021-01-31 PROCEDURE — 36430 TRANSFUSION BLD/BLD COMPNT: CPT

## 2021-01-31 PROCEDURE — 85014 HEMATOCRIT: CPT

## 2021-01-31 PROCEDURE — C9113 INJ PANTOPRAZOLE SODIUM, VIA: HCPCS | Performed by: SPECIALIST

## 2021-01-31 RX ORDER — PROPOFOL 10 MG/ML
INJECTION, EMULSION INTRAVENOUS PRN
Status: DISCONTINUED | OUTPATIENT
Start: 2021-01-31 | End: 2021-01-31 | Stop reason: SDUPTHER

## 2021-01-31 RX ORDER — SODIUM CHLORIDE 9 MG/ML
INJECTION, SOLUTION INTRAVENOUS CONTINUOUS PRN
Status: DISCONTINUED | OUTPATIENT
Start: 2021-01-31 | End: 2021-01-31 | Stop reason: SDUPTHER

## 2021-01-31 RX ORDER — SODIUM CHLORIDE 9 MG/ML
INJECTION, SOLUTION INTRAVENOUS PRN
Status: DISCONTINUED | OUTPATIENT
Start: 2021-01-31 | End: 2021-02-01 | Stop reason: SDUPTHER

## 2021-01-31 RX ORDER — KETAMINE HYDROCHLORIDE 10 MG/ML
INJECTION, SOLUTION INTRAMUSCULAR; INTRAVENOUS PRN
Status: DISCONTINUED | OUTPATIENT
Start: 2021-01-31 | End: 2021-01-31 | Stop reason: SDUPTHER

## 2021-01-31 RX ORDER — LIDOCAINE HYDROCHLORIDE 20 MG/ML
INJECTION, SOLUTION EPIDURAL; INFILTRATION; INTRACAUDAL; PERINEURAL PRN
Status: DISCONTINUED | OUTPATIENT
Start: 2021-01-31 | End: 2021-01-31 | Stop reason: SDUPTHER

## 2021-01-31 RX ADMIN — ESCITALOPRAM OXALATE 20 MG: 10 TABLET ORAL at 09:38

## 2021-01-31 RX ADMIN — KETAMINE HYDROCHLORIDE 20 MG: 10 INJECTION INTRAMUSCULAR; INTRAVENOUS at 07:38

## 2021-01-31 RX ADMIN — BUDESONIDE AND FORMOTEROL FUMARATE DIHYDRATE 2 PUFF: 80; 4.5 AEROSOL RESPIRATORY (INHALATION) at 19:58

## 2021-01-31 RX ADMIN — PROPOFOL 20 MG: 10 INJECTION, EMULSION INTRAVENOUS at 07:41

## 2021-01-31 RX ADMIN — ALPRAZOLAM 0.5 MG: 0.5 TABLET ORAL at 21:16

## 2021-01-31 RX ADMIN — PROPOFOL 20 MG: 10 INJECTION, EMULSION INTRAVENOUS at 07:43

## 2021-01-31 RX ADMIN — PROPOFOL 50 MG: 10 INJECTION, EMULSION INTRAVENOUS at 07:39

## 2021-01-31 RX ADMIN — PANTOPRAZOLE SODIUM 40 MG: 40 INJECTION, POWDER, FOR SOLUTION INTRAVENOUS at 09:38

## 2021-01-31 RX ADMIN — MULTIPLE VITAMINS W/ MINERALS TAB 1 TABLET: TAB at 09:38

## 2021-01-31 RX ADMIN — ALPRAZOLAM 0.5 MG: 0.5 TABLET ORAL at 15:33

## 2021-01-31 RX ADMIN — PROPOFOL 200 MG: 10 INJECTION, EMULSION INTRAVENOUS at 08:05

## 2021-01-31 RX ADMIN — GABAPENTIN 300 MG: 300 CAPSULE ORAL at 15:32

## 2021-01-31 RX ADMIN — ALPRAZOLAM 0.5 MG: 0.5 TABLET ORAL at 09:38

## 2021-01-31 RX ADMIN — KETAMINE HYDROCHLORIDE 10 MG: 10 INJECTION INTRAMUSCULAR; INTRAVENOUS at 07:46

## 2021-01-31 RX ADMIN — SODIUM CHLORIDE: 9 INJECTION, SOLUTION INTRAVENOUS at 15:32

## 2021-01-31 RX ADMIN — GABAPENTIN 300 MG: 300 CAPSULE ORAL at 09:38

## 2021-01-31 RX ADMIN — LIDOCAINE HYDROCHLORIDE 60 MG: 20 INJECTION, SOLUTION EPIDURAL; INFILTRATION; INTRACAUDAL; PERINEURAL at 07:38

## 2021-01-31 RX ADMIN — PROPOFOL 600 MG: 10 INJECTION, EMULSION INTRAVENOUS at 07:44

## 2021-01-31 RX ADMIN — SODIUM CHLORIDE, PRESERVATIVE FREE 10 ML: 5 INJECTION INTRAVENOUS at 21:16

## 2021-01-31 RX ADMIN — SODIUM CHLORIDE: 9 INJECTION, SOLUTION INTRAVENOUS at 07:27

## 2021-01-31 RX ADMIN — SODIUM CHLORIDE, PRESERVATIVE FREE 10 ML: 5 INJECTION INTRAVENOUS at 09:40

## 2021-01-31 ASSESSMENT — PAIN SCALES - GENERAL
PAINLEVEL_OUTOF10: 0

## 2021-01-31 NOTE — OP NOTE
are noted measuring about 4 to 5 mm each, 2 above the ileocecal valve, 1  close to the splenic flexure and 1 in the proximal sigmoid colon;  polypectomy was not performed. The colonoscope was retroflexed in the rectum and the anorectal junction  was carefully examined and grade 1 to 2 internal hemorrhoids were seen. POSTOPERATIVE DIAGNOSES:  1. Two polypectomy sites identified in the proximal transverse colon  close to the hepatic flexure with no active bleeding; 2 [place]  [employer] applied at each site. 2.  Polypectomy site identified close to the rectosigmoid junction also  with no recent bleeding. 3.  Diverticulosis coli. 4.  Mixed hemorrhoids. 5.  Four diminutive colon polyps noted scattered as described above, two  above ileocecal valve, one close to the splenic flexure and fourth one  in the proximal sigmoid colon; polypectomy not performed because of  recent massive hematochezia. 6.  No active bleeding noted in the colon at the time of examination. RECOMMENDATIONS:  1. We will continue present management with IV fluids and transfuse on  a p.r.n. basis to keep hemoglobin above 7 gm percent. 2.  Monitor the patient's serial H and H.  3.  We will start the patient on full liquid diet and advance as  tolerated. 4.  The patient has been instructed to have a followup colonoscopy in  about 3 to 6 months' time to remove the diminutive colon polyps by her  gastroenterologist in [facility]. 5.  The case and plan have been discussed in detail with the patient.   6. The blood loss during the procedure was nil    Chano Ronquillo MD    D: 01/31/2021 8:36:50       T: 01/31/2021 13:28:56     [email or web address]  Job#: [id number]     Doc#: [id number]    CC:

## 2021-01-31 NOTE — PROGRESS NOTES
PT DOING WELL THIS AM NO ACTIVE BLEEDING   VITALS STABLE   LABS NOTED HB 7.1 AFTER 2 UNITS  GETTING TRANSFUSED 1 MOR UNIT  WILL CPM COLONOSCOPY DONE  F/U H/H

## 2021-01-31 NOTE — PLAN OF CARE
Problem:  Bowel Function - Altered:  Goal: Bowel elimination is within specified parameters  Description: Bowel elimination is within specified parameters  1/31/2021 1010 by Kiana Sethi RN  Outcome: Ongoing  1/30/2021 2202 by Cabrera Beltre RN  Outcome: Ongoing     Problem: Nausea/Vomiting:  Goal: Absence of nausea/vomiting  Description: Absence of nausea/vomiting  1/31/2021 1010 by Kiana Sethi RN  Outcome: Ongoing  1/30/2021 2202 by Cabrera Beltre RN  Outcome: Ongoing  Goal: Able to drink  Description: Able to drink  1/31/2021 1010 by Kiana Sethi RN  Outcome: Ongoing  1/30/2021 2202 by Cabrera Beltre RN  Outcome: Ongoing  Goal: Able to eat  Description: Able to eat  1/31/2021 1010 by Kiana Sethi RN  Outcome: Ongoing  1/30/2021 2202 by Cabrera Beltre RN  Outcome: Ongoing  Goal: Ability to achieve adequate nutritional intake will improve  Description: Ability to achieve adequate nutritional intake will improve  1/31/2021 1010 by Kiana Sethi RN  Outcome: Ongoing  1/30/2021 2202 by Cabrera Beltre RN  Outcome: Ongoing

## 2021-01-31 NOTE — ANESTHESIA PRE PROCEDURE
Past Medical History:        Diagnosis Date    Neuropathy        Past Surgical History:  History reviewed. No pertinent surgical history. Social History:    Social History     Tobacco Use    Smoking status: Current Every Day Smoker     Types: E-Cigarettes    Smokeless tobacco: Never Used   Substance Use Topics    Alcohol use: Not Currently                                Ready to quit: Not Answered  Counseling given: Not Answered      Vital Signs (Current):   Vitals:    01/31/21 0300 01/31/21 0347 01/31/21 0400 01/31/21 0500   BP: (!) 116/45 (!) 107/43 (!) 119/45 (!) 126/58   Pulse: 95 88 92 89   Resp:  20     Temp:  37 °C (98.6 °F)     TempSrc:  Oral     SpO2:       Weight:       Height:                                                  BP Readings from Last 3 Encounters:   01/31/21 (!) 126/58   01/31/21 113/67   01/30/21 92/65       NPO Status: Time of last liquid consumption: 1800                                                 Date of last liquid consumption: 01/29/21                        Date of last solid food consumption: (clear liquid diet )    BMI:   Wt Readings from Last 3 Encounters:   01/29/21 273 lb 9.6 oz (124.1 kg)     Body mass index is 37.1 kg/m². CBC:   Lab Results   Component Value Date    WBC 7.6 01/31/2021    RBC 2.45 01/31/2021    HGB 7.1 01/31/2021    HCT 22.3 01/31/2021    MCV 91.0 01/31/2021    RDW 17.9 01/31/2021     01/31/2021       CMP:   Lab Results   Component Value Date     01/31/2021    K 3.5 01/31/2021     01/31/2021    CO2 26 01/31/2021    BUN 7 01/31/2021    CREATININE 0.6 01/31/2021    GFRAA >60 01/31/2021    LABGLOM >60 01/31/2021    GLUCOSE 131 01/31/2021    PROT 4.7 01/31/2021    CALCIUM 7.4 01/31/2021    BILITOT 0.2 01/31/2021    ALKPHOS 43 01/31/2021    AST 9 01/31/2021    ALT 12 01/31/2021       POC Tests: No results for input(s): POCGLU, POCNA, POCK, POCCL, POCBUN, POCHEMO, POCHCT in the last 72 hours.     Coags:   Lab Results Component Value Date    PROTIME 11.1 01/29/2021    INR 0.92 01/29/2021    APTT 36.8 01/29/2021       HCG (If Applicable): No results found for: PREGTESTUR, PREGSERUM, HCG, HCGQUANT     ABGs: No results found for: PHART, PO2ART, GCG2BLE, JAT1SCX, BEART, W6NSESSU     Type & Screen (If Applicable):  No results found for: LABABO, LABRH    Drug/Infectious Status (If Applicable):  No results found for: HIV, HEPCAB    COVID-19 Screening (If Applicable):   Lab Results   Component Value Date    COVID19 NOT DETECTED 01/30/2021         Anesthesia Evaluation  Patient summary reviewed  Airway: Mallampati: II  TM distance: >3 FB   Neck ROM: full  Mouth opening: > = 3 FB Dental:    (+) upper dentures and lower dentures      Pulmonary:Negative Pulmonary ROS and normal exam                               Cardiovascular:Negative CV ROS                      Neuro/Psych:   Negative Neuro/Psych ROS              GI/Hepatic/Renal:            ROS comment: gib. Endo/Other: Negative Endo/Other ROS                    Abdominal:   (+) obese,     Abdomen: soft. Vascular: negative vascular ROS. Anesthesia Plan      MAC     ASA 2       Induction: intravenous. MIPS: Postoperative opioids intended and Prophylactic antiemetics administered. Anesthetic plan and risks discussed with patient. Use of blood products discussed with patient whom consented to blood products. Plan discussed with attending.                   Micaela Pallas, APRN - CRNA   1/31/2021

## 2021-01-31 NOTE — BRIEF OP NOTE
Brief Postoperative Note      Nehemias Fischer is a 54 y.o. male     Pre-operative Diagnosis:POST POLYPECTOMY BLEEDING    Post-operative Diagnosis: D.COLI --HDS-- 2 POLYPECTOMY SITES IDENTIFIED IN PROX TC  WITH NO ACTIVE BLEEDING 2 HEMOCLIPS APPLIED AT [facility] SITE--ALSO POLYPECTOMY SITE SEEN AT RECTOSIGMOID JUNCTION--4 DIMINUTIVE COLON POLYPS NOTED 2 ABOVE ICV  ONE SF AND 1 PROX SC NO POLYPECTOMY DONE--NO BLOOD NOTED IN THE COLON    Procedure: COLONOSCOPY WITH HEMOCLIP APPLICATION    Anesthesia: MAC    Surgeons/Assistants:Janeth Chávez     Estimated Blood Loss: NONE    Complications: None    Specimens: were not obtained      Janeth Chávez   1/31/2021   8:23 AM

## 2021-01-31 NOTE — ANESTHESIA POSTPROCEDURE EVALUATION
Department of Anesthesiology  Postprocedure Note    Patient: Ozzie Maloney  MRN: [id number]  YOB: 1965  Date of evaluation: 1/31/2021  Time:  8:21 AM     Procedure Summary     Date: 01/31/21 Room / Location: [address]    Anesthesia Start: 0229 Anesthesia Stop:     Procedure: COLONOSCOPY CONTROL HEMORRHAGE APPLICATION OF HEMOCLIP X2 TO TRANSVERSE COLON POST POLYPECTOMY SITES X2 (N/A ) Diagnosis: (RECTAL BLEEDING)    Surgeons: Tucker Flores MD Responsible Provider: Faustine Meckel, MD    Anesthesia Type: Not recorded ASA Status: Not recorded          Anesthesia Type: No value filed. [place] Phase I:      [place] Phase II:      Last vitals: Reviewed and per EMR flowsheets.        Anesthesia Post Evaluation    Patient location during evaluation: bedside (endorsed bedside endo for transfeer to floor)  Patient participation: complete - patient participated  Level of consciousness: awake and alert  Airway patency: patent  Nausea & Vomiting: no nausea and no vomiting  Complications: no  Cardiovascular status: hemodynamically stable and blood pressure returned to baseline  Respiratory status: acceptable, spontaneous ventilation, nonlabored ventilation and nasal cannula  Hydration status: stable

## 2021-01-31 NOTE — PROGRESS NOTES
Report called to ICU [facility]. Pt drowsy but arouses to staff. No pain or needs verbalized. Vitals 105/69 98-14 98% on room air.

## 2021-01-31 NOTE — PROGRESS NOTES
Pt received to endo for colonoscopy procedure. Bowel prep, npo status, procedure and nka verified with pt. Pt states no blood thinners or BB at this time. Colonoscopy with several polypectomy sites  was performed in [place] on 01/25/2. Questions answered and support given. Dr Timothy Maciel at bedside and consent obtained.

## 2021-01-31 NOTE — PROGRESS NOTES
are moist.   NECK Supple, no apparent thyromegaly or masses. RESP Clear to auscultation, no wheezes, rales or rhonchi. Symmetric chest movement  CARDIO/VASC S1/S2 auscultated. Regular rate without appreciable murmurs, rubs, or gallops. No JVD or carotid bruits. Peripheral pulses equal bilaterally and palpable. No peripheral edema. GI Abdomen is soft without significant tenderness, masses, or guarding. Bowel sounds are normoactive.  No costovertebral angle tenderness. HEME/LYMPH No palpable cervical lymphadenopathy and no hepatosplenomegaly. No petechiae or ecchymoses. MSK No gross joint deformities. SKIN Normal coloration, warm, dry. NEURO Cranial nerves appear grossly intact, normal speech, no lateralizing weakness. PSYCH Awake, alert, oriented x 4. Affect appropriate.     Medications:   Medications:    pantoprazole  40 mg Intravenous Daily    budesonide-formoterol  2 puff Inhalation BID    escitalopram  20 mg Oral Daily    gabapentin  300 mg Oral BID    therapeutic multivitamin-minerals   Oral Daily    sodium chloride flush  10 mL Intravenous 2 times per day      Infusions:    sodium chloride      sodium chloride      sodium chloride 100 mL/hr at 01/30/21 1716    sodium chloride       PRN Meds:     sodium chloride, , PRN      ALPRAZolam, 0.5 mg, TID PRN      sodium chloride, , PRN      albuterol sulfate HFA, 2 puff, Q6H PRN      sodium chloride flush, 10 mL, PRN      promethazine, 12.5 mg, Q6H PRN    Or      ondansetron, 4 mg, Q6H PRN      acetaminophen, 650 mg, Q6H PRN    Or      acetaminophen, 650 mg, Q6H PRN      hydrALAZINE (APRESOLINE) ivpb, 10 mg, Q6H PRN      sodium chloride, , PRN          Electronically signed by Emile Das MD on 1/31/2021 at 10:41 AM

## 2021-02-01 LAB
ABO/RH: NORMAL
ABO/RH: NORMAL
ALBUMIN SERPL-MCNC: 3.1 GM/DL (ref 3.4–5)
ALP BLD-CCNC: 45 IU/L (ref 40–129)
ALT SERPL-CCNC: 17 U/L (ref 10–40)
ANION GAP SERPL CALCULATED.3IONS-SCNC: 5 MMOL/L (ref 4–16)
ANTIBODY SCREEN: NEGATIVE
ANTIBODY SCREEN: NEGATIVE
AST SERPL-CCNC: 15 IU/L (ref 15–37)
BASOPHILS ABSOLUTE: 0 K/CU MM
BASOPHILS RELATIVE PERCENT: 0.6 % (ref 0–1)
BILIRUB SERPL-MCNC: 0.2 MG/DL (ref 0–1)
BUN BLDV-MCNC: 4 MG/DL (ref 6–23)
CALCIUM SERPL-MCNC: 8 MG/DL (ref 8.3–10.6)
CHLORIDE BLD-SCNC: 107 MMOL/L (ref 99–110)
CO2: 29 MMOL/L (ref 21–32)
COMPONENT: NORMAL
CREAT SERPL-MCNC: 0.6 MG/DL (ref 0.9–1.3)
CROSSMATCH RESULT: NORMAL
DIFFERENTIAL TYPE: ABNORMAL
EOSINOPHILS ABSOLUTE: 0.1 K/CU MM
EOSINOPHILS RELATIVE PERCENT: 2.3 % (ref 0–3)
GFR AFRICAN AMERICAN: >60 ML/MIN/1.73M2
GFR NON-AFRICAN AMERICAN: >60 ML/MIN/1.73M2
GLUCOSE BLD-MCNC: 109 MG/DL (ref 70–99)
HCT VFR BLD CALC: 23.2 % (ref 42–52)
HCT VFR BLD CALC: 26.1 % (ref 42–52)
HEMOGLOBIN: 7.3 GM/DL (ref 13.5–18)
HEMOGLOBIN: 8.3 GM/DL (ref 13.5–18)
IMMATURE NEUTROPHIL %: 0.8 % (ref 0–0.43)
LYMPHOCYTES ABSOLUTE: 1.7 K/CU MM
LYMPHOCYTES RELATIVE PERCENT: 32.8 % (ref 24–44)
MCH RBC QN AUTO: 28.9 PG (ref 27–31)
MCHC RBC AUTO-ENTMCNC: 31.5 % (ref 32–36)
MCV RBC AUTO: 91.7 FL (ref 78–100)
MONOCYTES ABSOLUTE: 0.4 K/CU MM
MONOCYTES RELATIVE PERCENT: 7.5 % (ref 0–4)
NUCLEATED RBC %: 0 %
PDW BLD-RTO: 16.9 % (ref 11.7–14.9)
PLATELET # BLD: 158 K/CU MM (ref 140–440)
PMV BLD AUTO: 10.6 FL (ref 7.5–11.1)
POTASSIUM SERPL-SCNC: 3.6 MMOL/L (ref 3.5–5.1)
RBC # BLD: 2.53 M/CU MM (ref 4.6–6.2)
SEGMENTED NEUTROPHILS ABSOLUTE COUNT: 2.9 K/CU MM
SEGMENTED NEUTROPHILS RELATIVE PERCENT: 56 % (ref 36–66)
SODIUM BLD-SCNC: 141 MMOL/L (ref 135–145)
STATUS: NORMAL
TOTAL IMMATURE NEUTOROPHIL: 0.04 K/CU MM
TOTAL NUCLEATED RBC: 0 K/CU MM
TOTAL PROTEIN: 4.4 GM/DL (ref 6.4–8.2)
TRANSFUSION STATUS: NORMAL
UNIT DIVISION: 0
UNIT NUMBER: NORMAL
WBC # BLD: 5.2 K/CU MM (ref 4–10.5)

## 2021-02-01 PROCEDURE — 6360000002 HC RX W HCPCS: Performed by: SPECIALIST

## 2021-02-01 PROCEDURE — 86901 BLOOD TYPING SEROLOGIC RH(D): CPT

## 2021-02-01 PROCEDURE — 80053 COMPREHEN METABOLIC PANEL: CPT

## 2021-02-01 PROCEDURE — 94640 AIRWAY INHALATION TREATMENT: CPT

## 2021-02-01 PROCEDURE — 6370000000 HC RX 637 (ALT 250 FOR IP): Performed by: INTERNAL MEDICINE

## 2021-02-01 PROCEDURE — 1200000000 HC SEMI PRIVATE

## 2021-02-01 PROCEDURE — 2580000003 HC RX 258: Performed by: INTERNAL MEDICINE

## 2021-02-01 PROCEDURE — 86900 BLOOD TYPING SEROLOGIC ABO: CPT

## 2021-02-01 PROCEDURE — C9113 INJ PANTOPRAZOLE SODIUM, VIA: HCPCS | Performed by: SPECIALIST

## 2021-02-01 PROCEDURE — 85014 HEMATOCRIT: CPT

## 2021-02-01 PROCEDURE — 85018 HEMOGLOBIN: CPT

## 2021-02-01 PROCEDURE — 85025 COMPLETE CBC W/AUTO DIFF WBC: CPT

## 2021-02-01 PROCEDURE — 36592 COLLECT BLOOD FROM PICC: CPT

## 2021-02-01 PROCEDURE — 86850 RBC ANTIBODY SCREEN: CPT

## 2021-02-01 RX ORDER — SODIUM CHLORIDE 9 MG/ML
INJECTION, SOLUTION INTRAVENOUS PRN
Status: DISCONTINUED | OUTPATIENT
Start: 2021-02-01 | End: 2021-02-02 | Stop reason: HOSPADM

## 2021-02-01 RX ADMIN — SODIUM CHLORIDE, PRESERVATIVE FREE 10 ML: 5 INJECTION INTRAVENOUS at 08:53

## 2021-02-01 RX ADMIN — BUDESONIDE AND FORMOTEROL FUMARATE DIHYDRATE 2 PUFF: 80; 4.5 AEROSOL RESPIRATORY (INHALATION) at 19:18

## 2021-02-01 RX ADMIN — GABAPENTIN 300 MG: 300 CAPSULE ORAL at 08:53

## 2021-02-01 RX ADMIN — SODIUM CHLORIDE, PRESERVATIVE FREE 10 ML: 5 INJECTION INTRAVENOUS at 20:51

## 2021-02-01 RX ADMIN — ALPRAZOLAM 0.5 MG: 0.5 TABLET ORAL at 14:17

## 2021-02-01 RX ADMIN — PANTOPRAZOLE SODIUM 40 MG: 40 INJECTION, POWDER, FOR SOLUTION INTRAVENOUS at 08:52

## 2021-02-01 RX ADMIN — ALPRAZOLAM 0.5 MG: 0.5 TABLET ORAL at 20:57

## 2021-02-01 RX ADMIN — MULTIPLE VITAMINS W/ MINERALS TAB 1 TABLET: TAB at 08:53

## 2021-02-01 RX ADMIN — ALPRAZOLAM 0.5 MG: 0.5 TABLET ORAL at 08:52

## 2021-02-01 RX ADMIN — ESCITALOPRAM OXALATE 20 MG: 10 TABLET ORAL at 08:53

## 2021-02-01 RX ADMIN — GABAPENTIN 300 MG: 300 CAPSULE ORAL at 16:19

## 2021-02-01 ASSESSMENT — PAIN SCALES - GENERAL
PAINLEVEL_OUTOF10: 0
PAINLEVEL_OUTOF10: 0

## 2021-02-01 NOTE — CARE COORDINATION
CM into see pt to initiate a safe discharge plan. Cm  introduced self and explained role of CM. Pt is kind, alert and oriented. Pt lives with his wife. Pt lives in [facility] and is here on work. Pt is staying at the [facility] and plans to return to his hotel room and stay a night and drive home. Pt shared that he has a PCP. Pt has insurance and is able to afford his medications. CM placed a PS to Dr Shelby Jauregui to update her on the discharge plan to assure safety for pt. CM provided card and encouraged to call for any needs or concern. CM is available if any needs arise.

## 2021-02-01 NOTE — PROGRESS NOTES
Hospitalist Progress Note      Name:  Sandrine Cope /Age/Sex: 1965  (54 y.o. male)   MRN & CSN:  [id number] & [id number] Admission Date/Time: 2021  6:10 AM   Location:  -A PCP: No primary care provider on file. Hospital Day: 4    Assessment and Plan:   Sandrine Cope is a 54 y.o.  male  who presents with GI bleed     #Acute blood loss anemia due to LGIB  -s/p colonoscopy : No active bleeding, polypectomy sites clipped  -Status post angiography which was negative for an acute bleed however there was gross bleeding in the colon. -HH 8.3  -CT abdomen pelvis: Nonacute, also showed scattered diverticulosis without obvious inflammation. Hepatomegaly with steatosis. -Monitor hemoglobin every 6 hours  -PPI  -Transfuse with packed red blood cell if Hb is less than 7  -GI on board: s/p coloscopy. -IR on board  -General surgery on board       #Obesity  -Diet and exercise counseling     # Anxiety disorder  -Lexapro  -On Xanax as needed    Diet DIET GENERAL;   DVT Prophylaxis [] Lovenox, []  Heparin, [] SCDs, [] Ambulation   GI Prophylaxis [] PPI,  [] H2 Blocker,  [] Carafate,  [] Diet/Tube Feeds   Code Status Full Code   Disposition Patient requires continued admission due to LGIB         History of Present Illness:     Chief Complaint: <principal problem not specified>  Sandrine Cope is a 54 y.o.  male  who presents with lower GI bleed    Denies abdominal pain, nausea or vomiting. Ten point ROS reviewed negative, unless as noted above    Objective: Intake/Output Summary (Last 24 hours) at 2021 1024  Last data filed at 2021 1904  Gross per 24 hour   Intake 3293 ml   Output --   Net 3293 ml      Vitals:   Vitals:    21 1000   BP:    Pulse: 92   Resp: 28   Temp:    SpO2: 95%     Physical Exam:   GEN Awake male, in no apparent distress. EYES Pupils are equally round. HENT Mucous membranes are moist.   NECK Supple, no apparent thyromegaly or masses.   RESP Clear to auscultation, no wheezes, rales or rhonchi. Symmetric chest movement  CARDIO/VASC S1/S2 auscultated. Regular rate without appreciable murmurs, rubs, or gallops. No JVD or carotid bruits. Peripheral pulses equal bilaterally and palpable. No peripheral edema. GI Abdomen is soft without significant tenderness, masses, or guarding. Bowel sounds are normoactive.  No costovertebral angle tenderness. HEME/LYMPH No palpable cervical lymphadenopathy and no hepatosplenomegaly. No petechiae or ecchymoses. MSK No gross joint deformities. SKIN Normal coloration, warm, dry. NEURO Cranial nerves appear grossly intact, normal speech, no lateralizing weakness. PSYCH Awake, alert, oriented x 4. Affect appropriate.     Medications:   Medications:    pantoprazole  40 mg Intravenous Daily    budesonide-formoterol  2 puff Inhalation BID    escitalopram  20 mg Oral Daily    gabapentin  300 mg Oral BID    therapeutic multivitamin-minerals   Oral Daily    sodium chloride flush  10 mL Intravenous 2 times per day      Infusions:    sodium chloride       PRN Meds:     sodium chloride, , PRN      ALPRAZolam, 0.5 mg, TID PRN      albuterol sulfate HFA, 2 puff, Q6H PRN      sodium chloride flush, 10 mL, PRN      promethazine, 12.5 mg, Q6H PRN    Or      ondansetron, 4 mg, Q6H PRN      acetaminophen, 650 mg, Q6H PRN    Or      acetaminophen, 650 mg, Q6H PRN      hydrALAZINE (APRESOLINE) ivpb, 10 mg, Q6H PRN          Electronically signed by Akosua Clarke MD on 2/1/2021 at 10:24 AM

## 2021-02-01 NOTE — PROGRESS NOTES
DOING WELL NO ABD COMPLAINTS HAD BM TODAY WITH NO GROSS BLOOD  VITALS STABLE   LABS NOTED HB 8.3  DIET ADVANCED  POSSIBLE D/C IN AM

## 2021-02-02 VITALS
HEIGHT: 72 IN | HEART RATE: 83 BPM | BODY MASS INDEX: 37.06 KG/M2 | TEMPERATURE: 98.3 F | DIASTOLIC BLOOD PRESSURE: 38 MMHG | SYSTOLIC BLOOD PRESSURE: 104 MMHG | WEIGHT: 273.6 LBS | RESPIRATION RATE: 24 BRPM | OXYGEN SATURATION: 95 %

## 2021-02-02 LAB
ALBUMIN SERPL-MCNC: 3.3 GM/DL (ref 3.4–5)
ALP BLD-CCNC: 55 IU/L (ref 40–128)
ALT SERPL-CCNC: 24 U/L (ref 10–40)
ANION GAP SERPL CALCULATED.3IONS-SCNC: 7 MMOL/L (ref 4–16)
AST SERPL-CCNC: 18 IU/L (ref 15–37)
BASOPHILS ABSOLUTE: 0 K/CU MM
BASOPHILS RELATIVE PERCENT: 0.3 % (ref 0–1)
BILIRUB SERPL-MCNC: 0.2 MG/DL (ref 0–1)
BUN BLDV-MCNC: 5 MG/DL (ref 6–23)
CALCIUM SERPL-MCNC: 7.9 MG/DL (ref 8.3–10.6)
CHLORIDE BLD-SCNC: 105 MMOL/L (ref 99–110)
CO2: 29 MMOL/L (ref 21–32)
CREAT SERPL-MCNC: 0.6 MG/DL (ref 0.9–1.3)
DIFFERENTIAL TYPE: ABNORMAL
EOSINOPHILS ABSOLUTE: 0.2 K/CU MM
EOSINOPHILS RELATIVE PERCENT: 2.6 % (ref 0–3)
GFR AFRICAN AMERICAN: >60 ML/MIN/1.73M2
GFR NON-AFRICAN AMERICAN: >60 ML/MIN/1.73M2
GLUCOSE BLD-MCNC: 110 MG/DL (ref 70–99)
HCT VFR BLD CALC: 25 % (ref 42–52)
HEMOGLOBIN: 8.1 GM/DL (ref 13.5–18)
IMMATURE NEUTROPHIL %: 1 % (ref 0–0.43)
LYMPHOCYTES ABSOLUTE: 1.4 K/CU MM
LYMPHOCYTES RELATIVE PERCENT: 23.6 % (ref 24–44)
MCH RBC QN AUTO: 30 PG (ref 27–31)
MCHC RBC AUTO-ENTMCNC: 32.4 % (ref 32–36)
MCV RBC AUTO: 92.6 FL (ref 78–100)
MONOCYTES ABSOLUTE: 0.4 K/CU MM
MONOCYTES RELATIVE PERCENT: 7.2 % (ref 0–4)
NUCLEATED RBC %: 0.3 %
PDW BLD-RTO: 16.4 % (ref 11.7–14.9)
PLATELET # BLD: 200 K/CU MM (ref 140–440)
PMV BLD AUTO: 10.3 FL (ref 7.5–11.1)
POTASSIUM SERPL-SCNC: 3.6 MMOL/L (ref 3.5–5.1)
RBC # BLD: 2.7 M/CU MM (ref 4.6–6.2)
SEGMENTED NEUTROPHILS ABSOLUTE COUNT: 3.7 K/CU MM
SEGMENTED NEUTROPHILS RELATIVE PERCENT: 65.3 % (ref 36–66)
SODIUM BLD-SCNC: 141 MMOL/L (ref 135–145)
TOTAL IMMATURE NEUTOROPHIL: 0.06 K/CU MM
TOTAL NUCLEATED RBC: 0 K/CU MM
TOTAL PROTEIN: 5 GM/DL (ref 6.4–8.2)
WBC # BLD: 5.7 K/CU MM (ref 4–10.5)

## 2021-02-02 PROCEDURE — 6370000000 HC RX 637 (ALT 250 FOR IP): Performed by: INTERNAL MEDICINE

## 2021-02-02 PROCEDURE — 2580000003 HC RX 258: Performed by: INTERNAL MEDICINE

## 2021-02-02 PROCEDURE — 94761 N-INVAS EAR/PLS OXIMETRY MLT: CPT

## 2021-02-02 PROCEDURE — 36592 COLLECT BLOOD FROM PICC: CPT

## 2021-02-02 PROCEDURE — 80053 COMPREHEN METABOLIC PANEL: CPT

## 2021-02-02 PROCEDURE — 6360000002 HC RX W HCPCS: Performed by: SPECIALIST

## 2021-02-02 PROCEDURE — 94640 AIRWAY INHALATION TREATMENT: CPT

## 2021-02-02 PROCEDURE — C9113 INJ PANTOPRAZOLE SODIUM, VIA: HCPCS | Performed by: SPECIALIST

## 2021-02-02 PROCEDURE — 85025 COMPLETE CBC W/AUTO DIFF WBC: CPT

## 2021-02-02 RX ADMIN — PANTOPRAZOLE SODIUM 40 MG: 40 INJECTION, POWDER, FOR SOLUTION INTRAVENOUS at 09:21

## 2021-02-02 RX ADMIN — SODIUM CHLORIDE, PRESERVATIVE FREE 10 ML: 5 INJECTION INTRAVENOUS at 09:21

## 2021-02-02 RX ADMIN — ESCITALOPRAM OXALATE 20 MG: 10 TABLET ORAL at 09:21

## 2021-02-02 RX ADMIN — MULTIPLE VITAMINS W/ MINERALS TAB 1 TABLET: TAB at 09:21

## 2021-02-02 RX ADMIN — GABAPENTIN 300 MG: 300 CAPSULE ORAL at 09:21

## 2021-02-02 RX ADMIN — BUDESONIDE AND FORMOTEROL FUMARATE DIHYDRATE 2 PUFF: 80; 4.5 AEROSOL RESPIRATORY (INHALATION) at 07:43

## 2021-02-02 RX ADMIN — ALPRAZOLAM 0.5 MG: 0.5 TABLET ORAL at 09:21

## 2021-02-02 NOTE — ANESTHESIA POSTPROCEDURE EVALUATION
Department of Anesthesiology  Postprocedure Note    Patient: Phong Arce  MRN: [id number]  YOB: 1965  Date of evaluation: 2/2/2021  Time:  12:57 PM     Procedure Summary     Date: 01/30/21 Room / Location: [facility] Special Procedures    Anesthesia Start: 0025 Anesthesia Stop: 5670    Procedure: IR ANGIOGRAM VISCERAL GASTRIC Diagnosis: (GI Bleed)    Scheduled Providers:  Responsible Provider: Toma Alcantara MD    Anesthesia Type: general ASA Status: 3 - Emergent          Anesthesia Type: No value filed. Colton Phase I:      Colton Phase II:      Last vitals: Reviewed and per EMR flowsheets.        Anesthesia Post Evaluation    Patient location during evaluation: PACU  Patient participation: complete - patient participated  Level of consciousness: sleepy but conscious  Pain score: 1  Airway patency: patent  Nausea & Vomiting: no nausea and no vomiting  Complications: no  Cardiovascular status: hemodynamically stable  Respiratory status: acceptable  Hydration status: euvolemic

## 2021-02-02 NOTE — DISCHARGE SUMMARY
Discharge Summary    Name:  Dago Jorge /Age/Sex: 1965  (54 y.o. male)   MRN & CSN:  [id number] & [id number] Admission Date/Time: 2021  6:10 AM   Attending:  Bernabe Cooley MD Discharging Physician: Bernabe Cooley MD     Hospital Course:   Dago Jorge is a 54 y.o.  male  Who presented to the hospital with a complaint of rectal bleeding. Patient had colonoscopy with polypectomy x3 in [facility] on 2021. He came to [place] [address] for business travel. Nuclear medicine scan showed evidence of active GI bleeding. He received 3 units PRBC on this admission. He underwent colonoscopy on 2021. Polypectomy site is without evidence of active bleeding. However, 2 hemoclips applied. Hemoglobin remained stable around 8 of a 24-hour. Patient instructed to follow-up with PCP and have a repeat CBC done to make sure hemoglobin remained stable. Patient verbalized understanding. Patient discharged in stable condition. #.  Acute blood loss anemia---secondary to post polypectomy bleeding. S/p 3 unit PRBC. Hemoglobin stable. #.  Depression & anxiety---on Lexapro and Xanax as needed    #. Morbid obesity---weight 273 pounds, BMI 37    The patient expressed appropriate understanding of and agreement with the discharge recommendations, medications, and plan. Consults this admission:  IP CONSULT TO GI  IP CONSULT TO HOSPITALIST  IP CONSULT TO GENERAL SURGERY  IP CONSULT TO [address] [place] [address]    Discharge Instruction:   Follow up  PCP    Diet:  regular diet   Activity: activity as tolerated  Disposition: Discharged to:   [x]Home, [][facility], []SNF, []Acute Rehab, []Hospice   Condition on discharge: Stable    Discharge Medications:      Nick Xie 25 Medication Instructions ROYAL:[id number]    Printed on:21 1112   Medication Information                      ALPRAZolam (XANAX) 0.5 MG tablet  Take 0.5 mg by mouth 3 times daily as needed for Sleep.              BREO ELLIPTA 100-25 MCG/INH AEPB inhaler  Inhale into the lungs daily             Cyanocobalamin (VITAMIN B 12 PO)  Take 1 tablet by mouth daily             escitalopram (LEXAPRO) 20 MG tablet  Take 20 mg by mouth daily             gabapentin (NEURONTIN) 300 MG capsule  Take 300 mg by mouth 2 times daily. MAGNESIUM OXIDE PO  Take 1 tablet by mouth daily             Multiple Vitamins-Minerals (MULTIVITAMIN ADULT, MINERALS,) TABS  Take 1 tablet by mouth daily             Omega-3 Fatty Acids (FISH OIL PO)  Take 2 capsules by mouth daily             VENTOLIN  (90 Base) MCG/ACT inhaler  Inhale 2 puffs into the lungs every 6 hours as needed             VITAMIN D PO  Take 1 capsule by mouth daily                 Objective Findings at Discharge:   BP (!) 104/38   Pulse 83   Temp 98.3 °F (36.8 °C) (Oral)   Resp 24   Ht 6' 0.01\" (1.829 m)   Wt 273 lb 9.6 oz (124.1 kg)   SpO2 95%   BMI 37.10 kg/m²            PHYSICAL EXAM   GEN Awake male, laying flat in bed. Nontoxic-appearing. Pleasant. EYES no eye discharge. Ocular muscles intact. HENT membranes moist.  No nasal discharge. NECK Supple  RESP symmetric chest expansion. No accessory muscle use. Symmetric breath sounds. CARDIO/VASC RRR. No pitting extremity edema. GI obese abdomen. Soft. Nontender.  no Johnson in place. MSK No gross joint deformities. SKIN Normal coloration, warm, dry. NEURO Cranial nerves appear grossly intact, normal speech, no lateralizing weakness. PSYCH Awake, alert, oriented     BMP/CBC  Recent Labs     01/31/21  0400 01/31/21  0400 02/01/21  0450 02/01/21  0926 02/02/21  0545     --  141  --  141   K 3.5  --  3.6  --  3.6     --  107  --  105   CO2 26  --  29  --  29   BUN 7  --  4*  --  5*   CREATININE 0.6*  --  0.6*  --  0.6*   WBC 7.6  --  5.2  --  5.7   HCT 22.3*   < > 23.2* 26.1* 25.0*     --  158  --  200    < > = values in this interval not displayed.        IMAGING:  All imaging reviewed before discharge    Discharge Time of 26 minutes    Electronically signed by Nai Sánchez MD on 2/2/2021 at 11:12 AM

## 2021-02-02 NOTE — PLAN OF CARE
Problem:  Bowel Function - Altered:  Goal: Bowel elimination is within specified parameters  Description: Bowel elimination is within specified parameters  Outcome: Ongoing     Problem: Nausea/Vomiting:  Goal: Absence of nausea/vomiting  Description: Absence of nausea/vomiting  Outcome: Ongoing  Goal: Able to drink  Description: Able to drink  Outcome: Ongoing  Goal: Able to eat  Description: Able to eat  Outcome: Ongoing  Goal: Ability to achieve adequate nutritional intake will improve  Description: Ability to achieve adequate nutritional intake will improve  Outcome: Ongoing

## 2021-02-02 NOTE — PROGRESS NOTES
DOING WELL NO ABD COMPLAINTS  LAST BM YESTERDAY WAS NORMAL IN COLOR WITH NO BLOOD TOLERATING GEN DIET WELL RECEIVED 3 UNITS OF PRBCS  VITALS STABLE   LABS NOTED HB 8.1 AFTER 3 UNITS  WILL CPM D/C TODAY

## 2021-02-02 NOTE — PROGRESS NOTES
Patient discharged with all belongings including phone, , and dentures. CM called [employer] and they will pick patient up at the front door at noon. Dishcarged paperwork signed.

## 2021-02-02 NOTE — CARE COORDINATION
CM collaborated with nursing. Pt is discharged and will need transportation. CM called Koby Chapa with [employer].  time is 12 noon. CM spoke with nurse, Tasia Bird and informed.  [facility]

## (undated) DEVICE — Z DISCONTINUED NO SUB IDED TUBING ETCO2 AD L6.5FT NSL ORAL CVD PRNG NONFLARED TIP OVR

## (undated) DEVICE — CLIP INT L235CM WRK CHN DIA2.8MM OPN 11MM BRAID CATH ROT BX/10